# Patient Record
Sex: FEMALE | Race: BLACK OR AFRICAN AMERICAN
[De-identification: names, ages, dates, MRNs, and addresses within clinical notes are randomized per-mention and may not be internally consistent; named-entity substitution may affect disease eponyms.]

---

## 2018-09-06 ENCOUNTER — HOSPITAL ENCOUNTER (EMERGENCY)
Dept: HOSPITAL 74 - JER | Age: 58
Discharge: HOME | End: 2018-09-06
Payer: COMMERCIAL

## 2018-09-06 VITALS — SYSTOLIC BLOOD PRESSURE: 129 MMHG | TEMPERATURE: 98.6 F | HEART RATE: 79 BPM | DIASTOLIC BLOOD PRESSURE: 84 MMHG

## 2018-09-06 VITALS — BODY MASS INDEX: 23.7 KG/M2

## 2018-09-06 DIAGNOSIS — I10: ICD-10-CM

## 2018-09-06 DIAGNOSIS — K21.9: Primary | ICD-10-CM

## 2018-09-06 DIAGNOSIS — R07.89: ICD-10-CM

## 2018-09-06 LAB
ALBUMIN SERPL-MCNC: 4 G/DL (ref 3.4–5)
ALP SERPL-CCNC: 74 U/L (ref 45–117)
ALT SERPL-CCNC: 24 U/L (ref 12–78)
ANION GAP SERPL CALC-SCNC: 10 MMOL/L (ref 8–16)
APPEARANCE UR: (no result)
AST SERPL-CCNC: 15 U/L (ref 15–37)
BASOPHILS # BLD: 0.3 % (ref 0–2)
BILIRUB SERPL-MCNC: 0.8 MG/DL (ref 0.2–1)
BILIRUB UR STRIP.AUTO-MCNC: NEGATIVE MG/DL
BUN SERPL-MCNC: 11 MG/DL (ref 7–18)
CALCIUM SERPL-MCNC: 9.4 MG/DL (ref 8.5–10.1)
CHLORIDE SERPL-SCNC: 107 MMOL/L (ref 98–107)
CO2 SERPL-SCNC: 27 MMOL/L (ref 21–32)
COLOR UR: (no result)
CREAT SERPL-MCNC: 0.9 MG/DL (ref 0.55–1.02)
DEPRECATED RDW RBC AUTO: 12.6 % (ref 11.6–15.6)
EOSINOPHIL # BLD: 0.8 % (ref 0–4.5)
GLUCOSE SERPL-MCNC: 158 MG/DL (ref 74–106)
HCT VFR BLD CALC: 39.1 % (ref 32.4–45.2)
HGB BLD-MCNC: 13.1 GM/DL (ref 10.7–15.3)
INR BLD: 1.01 (ref 0.83–1.09)
KETONES UR QL STRIP: NEGATIVE
LEUKOCYTE ESTERASE UR QL STRIP.AUTO: NEGATIVE
LYMPHOCYTES # BLD: 34.1 % (ref 8–40)
MCH RBC QN AUTO: 30.5 PG (ref 25.7–33.7)
MCHC RBC AUTO-ENTMCNC: 33.6 G/DL (ref 32–36)
MCV RBC: 90.9 FL (ref 80–96)
MONOCYTES # BLD AUTO: 7.4 % (ref 3.8–10.2)
NEUTROPHILS # BLD: 57.4 % (ref 42.8–82.8)
NITRITE UR QL STRIP: NEGATIVE
PH UR: 8 [PH] (ref 5–8)
PLATELET # BLD AUTO: 210 K/MM3 (ref 134–434)
PMV BLD: 10.5 FL (ref 7.5–11.1)
POTASSIUM SERPLBLD-SCNC: 3.6 MMOL/L (ref 3.5–5.1)
PROT SERPL-MCNC: 8.1 G/DL (ref 6.4–8.2)
PROT UR QL STRIP: NEGATIVE
PROT UR QL STRIP: NEGATIVE
PT PNL PPP: 11.4 SEC (ref 9.7–13)
RBC # BLD AUTO: 4.3 M/MM3 (ref 3.6–5.2)
SODIUM SERPL-SCNC: 144 MMOL/L (ref 136–145)
SP GR UR: 1 (ref 1–1.03)
UROBILINOGEN UR STRIP-MCNC: NEGATIVE MG/DL (ref 0.2–1)
WBC # BLD AUTO: 4.8 K/MM3 (ref 4–10)

## 2018-09-06 PROCEDURE — 3E033GC INTRODUCTION OF OTHER THERAPEUTIC SUBSTANCE INTO PERIPHERAL VEIN, PERCUTANEOUS APPROACH: ICD-10-PCS | Performed by: EMERGENCY MEDICINE

## 2018-09-06 NOTE — EKG
Test Reason : 

Blood Pressure : ***/*** mmHG

Vent. Rate : 097 BPM     Atrial Rate : 097 BPM

   P-R Int : 202 ms          QRS Dur : 076 ms

    QT Int : 338 ms       P-R-T Axes : 053 055 053 degrees

   QTc Int : 429 ms

 

NORMAL SINUS RHYTHM

POSSIBLE LEFT ATRIAL ENLARGEMENT

NONSPECIFIC T WAVE ABNORMALITY

ABNORMAL ECG

NO PREVIOUS ECGS AVAILABLE

Confirmed by JING VIVEROS MD ([id number]) on 9/6/2018 1:43:18 PM

 

Referred By:             Confirmed By:JING VIVEROS MD

## 2018-09-06 NOTE — PDOC
Attending Attestation





- Resident


Resident Name: Kendall Tobin





- ED Attending Attestation


I have performed the following: I have examined & evaluated the patient, The 

case was reviewed & discussed with the resident, I agree w/resident's findings 

& plan, Exceptions are as noted





- HPI


HPI: 





09/06/18 10:49





57 yo female w/ pmh of HTN, GERD, and DM who presents for evaluation of 2-3 day 

history of left sided chest pain.  Pain is nonexertional intermittent no 

exacerbating or alleviating factors no associated dizziness lightheadedness 

nausea. Patient has had this pain intermittently over the last 2 years has seen 

cardiology for and has had a catheterization in the past which was normal per 

the patient





She has recently followed up with gastroenterology for further evaluation of 

this had a normal endoscopy and is scheduled next week for a ultrasound








ROS:  A complete review of 10 out of 10 review of systems is taken and is 

negative apart from what is previously mentioned below and in the HPI.





- Physicial Exam


PE: 





09/06/18 10:50








Vitals: Triage Vital signs reviewed  


General Appearance:  no acute distress, well nourished well developed, 


Head: Atraumatic, 


Neck:  Supple;No Nucal rigidity


Chest Wall: Nontender


Cardiac: Regular rate and rhythym, no murmurs, no rubs, no gallops, 


Lungs: Clear to auscultation bilateral, good air movement bilaterally,


Abdomen:  Soft, non distended, normal bowel sounds, non tender to palpation


Extremities: Full range of motion to all extremities, no cyanosis, clubbing, or 

edema


Skin:  Warm and dry, no rashes or lesions, no rash, no petechiae








- Medical Decision Making





09/06/18 10:51





Atypical chest discomfort chronic no acute changes heart score to EKG 

nonischemic troponin negative





Patient has follow-up.





Feels better after Pepcid





Sitting comfortably in the emergency department eating a sandwich.





Findings, need for follow-up and strict return instructions discussed with 

patient.








**Heart Score/ECG Review





- History


History: Slightly suspicious





- Electrocardiogram


EKG: Normal





- Age


Age: 45-65





- Risk Factors


Risk Factors Heart Score: Yes Hx Hypertension, Yes Hx Diabetes


Based on the list above the patient has:: 1-2 risk factors





- Troponin


Troponin: </= normal limit





- Score


Heart Score - Total: 2





- ECG Impressions


Comment:: 





09/06/18 10:52





EKG performed at 8:10 AM. Demonstrates normal sinus rhythm NH interval 2 or 2, 

QRS 76, . No ST elevations. No T-wave inversions.





Interpreted by me.

## 2018-09-06 NOTE — PDOC
History of Present Illness





- General


Stated Complaint: THROAT PAIN


Time Seen by Provider: 09/06/18 08:04





- History of Present Illness


Initial Comments: 





09/06/18 08:04


Ms. Damon is a 59 yo female w/ pmh of HTN, GERD, and DM who presents for 

evaluation of 2-3 day history of left sided chest pain. Patient additionally 

reports difficulty sleeping last night and that she woke up from a dream with 

epigastric pain and throat pain. Patient has previously had episodes of 

palpitations for which she has been evaluated with a holter monitor with no 

acute findings. 





The patient denies shortness of breath, headache and dizziness. Denies fever, 

chills, nausea, vomit, diarrhea and constipation. Denies dysuria, frequency, 

urgency and hematuria. 





Allergies: NKDA





Past History





- Past Medical History


Allergies/Adverse Reactions: 


 Allergies











Allergy/AdvReac Type Severity Reaction Status Date / Time


 


No Known Allergies Allergy   Verified 09/06/18 07:58











Home Medications: 


Ambulatory Orders





Amlodipine Besylate 40 mg PO DAILY 09/06/18 


Hydrochlorothiazide [Hctz -] 12.5 mg PO DAILY 09/06/18 


Lisinopril [Prinivil] 20 mg PO DAILY 09/06/18 











**Review of Systems





- Review of Systems


Comments:: 





09/06/18 08:04


GENERAL/CONSTITUTIONAL: No fever or chills. No weakness.


HEAD, EYES, EARS, NOSE AND THROAT: +Throat pain x1 day. No change in vision. No 

ear pain or discharge. 


CARDIOVASCULAR: +Left sided constant chest pain. No shortness of breath


RESPIRATORY: No cough, wheezing, or hemoptysis.


GASTROINTESTINAL: +Epigastric pain as described. No nausea, vomiting, diarrhea 

or constipation.


GENITOURINARY: No dysuria, frequency, or change in urination.


MUSCULOSKELETAL: No joint or muscle swelling or pain. No neck or back pain.


SKIN: No rash


NEUROLOGIC: No headache, vertigo, loss of consciousness, or change in strength/

sensation.


ENDOCRINE: No increased thirst. No abnormal weight change


HEMATOLOGIC/LYMPHATIC: No anemia, easy bleeding, or history of blood clots.


ALLERGIC/IMMUNOLOGIC: No hives or skin allergy.





*Physical Exam





- Physical Exam


Comments: 





09/06/18 08:05


GENERAL: Awake, alert, and fully oriented, in no acute distress


HEAD: No signs of trauma, normocephalic, atraumatic 


EYES: PERRLA, EOMI, sclera anicteric, conjunctiva clear


ENT: Auricles normal inspection, hearing grossly normal, nares patent, 

oropharynx clear without


exudates. Moist mucosa


NECK: Normal ROM, supple, no lymphadenopathy, JVD, or masses


LUNGS: No distress, speaks full sentences, clear to auscultation bilaterally 


HEART: +Mild tachycardia. Regular rhythm, normal S1 and S2, no murmurs, rubs or 

gallops, peripheral pulses normal and equal bilaterally. 


ABDOMEN: +Epigastric TTP. Soft, normoactive bowel sounds. No guarding, no 

rebound. No masses


EXTREMITIES: Normal inspection, Normal range of motion, no edema. No clubbing 

or cyanosis. 


NEUROLOGICAL: Cranial nerves II through XII grossly intact. Normal speech, 

normal gait, no focal sensorimotor deficits 


SKIN: Warm, Dry, normal turgor, no rashes or lesions noted. 








ED Treatment Course





- LABORATORY


CBC & Chemistry Diagram: 


 09/06/18 08:40





 09/06/18 08:40





Medical Decision Making





- Medical Decision Making





09/06/18 08:37


Ms. Damon is a 59 yo female w/ pmh as described who presents for evaluation of 

chest pain complicated by epigastric and throat pain concerning for GERD vs. 

ACS. Will evaluate with monitor/EKG/cardiac labs and medicate for symptomatic 

relief.





09/06/18 08:39


EKG mildly tachy at rate of 97, otherwise regular rhythm, normal access, normal 

interval, no ST elevations or depressions - normal EKG.





09/06/18 10:43


Patient reporting relief from symptoms following pepcid and maalox. Suspect 

GERD as etiology of symptoms. Labs grossly wnl as below. Discharging to home w/ 

instructions to f/u w/ PCP for further evaluation. Patient verbalized 

understanding and agreement and will comply.





 Laboratory Results - last 24 hr











  09/06/18 09/06/18 09/06/18





  08:40 08:40 08:40


 


WBC  4.8  


 


RBC  4.30  


 


Hgb  13.1  


 


Hct  39.1  


 


MCV  90.9  


 


MCH  30.5  


 


MCHC  33.6  


 


RDW  12.6  


 


Plt Count  210  


 


MPV  10.5  


 


Absolute Neuts (auto)  2.7  


 


Neutrophils %  57.4  


 


Lymphocytes %  34.1  


 


Monocytes %  7.4  


 


Eosinophils %  0.8  


 


Basophils %  0.3  


 


Nucleated RBC %  0  


 


PT with INR   11.40 


 


INR   1.01 


 


Sodium    144


 


Potassium    3.6


 


Chloride    107


 


Carbon Dioxide    27


 


Anion Gap    10


 


BUN    11


 


Creatinine    0.9


 


Creat Clearance w eGFR    > 60


 


Random Glucose    158 H


 


Calcium    9.4


 


Total Bilirubin    0.8


 


AST    15


 


ALT    24


 


Alkaline Phosphatase    74


 


Creatine Kinase    81


 


Troponin I    < 0.02


 


Total Protein    8.1


 


Albumin    4.0


 


Urine Color   


 


Urine Appearance   


 


Urine pH   


 


Ur Specific Gravity   


 


Urine Protein   


 


Urine Glucose (UA)   


 


Urine Ketones   


 


Urine Blood   


 


Urine Nitrite   


 


Urine Bilirubin   


 


Urine Urobilinogen   


 


Ur Leukocyte Esterase   














  09/06/18





  09:02


 


WBC 


 


RBC 


 


Hgb 


 


Hct 


 


MCV 


 


MCH 


 


MCHC 


 


RDW 


 


Plt Count 


 


MPV 


 


Absolute Neuts (auto) 


 


Neutrophils % 


 


Lymphocytes % 


 


Monocytes % 


 


Eosinophils % 


 


Basophils % 


 


Nucleated RBC % 


 


PT with INR 


 


INR 


 


Sodium 


 


Potassium 


 


Chloride 


 


Carbon Dioxide 


 


Anion Gap 


 


BUN 


 


Creatinine 


 


Creat Clearance w eGFR 


 


Random Glucose 


 


Calcium 


 


Total Bilirubin 


 


AST 


 


ALT 


 


Alkaline Phosphatase 


 


Creatine Kinase 


 


Troponin I 


 


Total Protein 


 


Albumin 


 


Urine Color  Ltyellow


 


Urine Appearance  Slcloudy


 


Urine pH  8.0


 


Ur Specific Gravity  1.005


 


Urine Protein  Negative


 


Urine Glucose (UA)  Negative


 


Urine Ketones  Negative


 


Urine Blood  Negative


 


Urine Nitrite  Negative


 


Urine Bilirubin  Negative


 


Urine Urobilinogen  Negative


 


Ur Leukocyte Esterase  Negative














*DC/Admit/Observation/Transfer


Diagnosis at time of Disposition: 


 Atypical chest pain





GERD (gastroesophageal reflux disease)


Qualifiers:


 Esophagitis presence: esophagitis presence not specified Qualified Code(s): 

K21.9 - Gastro-esophageal reflux disease without esophagitis








- Discharge Dispostion


Disposition: HOME





- Referrals





- Patient Instructions


Printed Discharge Instructions:  DI for Gastroesophageal Reflux Disease (GERD), 

DI for Atypical Chest Pain


Additional Instructions: 


You were evaluated today in the ER for your chest and epigastric pain. No acute 

findings were seen at this time and your pain improved with treatment of 

reflux. Please follow-up with primary care provider in 1-2 days for further 

evaluation. Return to ER if any return of pain, fever, chills, or other 

concerning symptoms.





- Post Discharge Activity

## 2018-10-05 ENCOUNTER — HOSPITAL ENCOUNTER (OUTPATIENT)
Dept: HOSPITAL 74 - JASU-SURG | Age: 58
Discharge: HOME | End: 2018-10-05
Attending: SURGERY
Payer: COMMERCIAL

## 2018-10-05 VITALS — SYSTOLIC BLOOD PRESSURE: 120 MMHG | HEART RATE: 90 BPM | TEMPERATURE: 97.8 F | DIASTOLIC BLOOD PRESSURE: 76 MMHG

## 2018-10-05 VITALS — BODY MASS INDEX: 23.7 KG/M2

## 2018-10-05 DIAGNOSIS — K66.0: ICD-10-CM

## 2018-10-05 DIAGNOSIS — K80.10: Primary | ICD-10-CM

## 2018-10-05 PROCEDURE — 0FT44ZZ RESECTION OF GALLBLADDER, PERCUTANEOUS ENDOSCOPIC APPROACH: ICD-10-PCS | Performed by: SURGERY

## 2018-10-05 NOTE — HP
History & Physical Update





- History


History: No Change





- Physical


Physical: No Change





- Assessment


Assessment: No Change





- Plan


Plan: No Change (H & P done on 09/27/18)

## 2018-10-05 NOTE — OP
DATE OF OPERATION:  10/05/2018

 

LOCATION:  Ambulatory surgery.

 

PROCEDURE:  Laparoscopic cholecystectomy and lysis of adhesions.  

 

PREOPERATIVE DIAGNOSIS:  Chronic cholecystitis with cholelithiasis.  

 

POSTOPERATIVE DIAGNOSIS:  Chronic cholecystitis with cholelithiasis and

intraabdominal adhesions.  

 

SURGEON:  Solo Ozuna M.D. 

 

FIRST ASSISTANT:  ODALIS Sanchez

 

ANESTHESIA:  General endotracheal anesthesia.

 

FINDINGS AND PROCEDURE:  This is a 58-year-old female who presents with chronic right

upper quadrant pain radiating to the back which initially she was treated for H.

pylori infection; however, the pain persisted, so patient an ultrasound was done and

revealed gallbladder stones versus sludge.  So patient was advised removal of the

gallbladder, and consent was obtained after discussing the risks, benefits, and

alternatives to the procedure.  

 

Patient was brought to the operating room and placed in supine position.  General

endotracheal anesthesia was then administered.  The abdomen was then prepped and

draped in the usual sterile fashion; using 0.5% Marcaine local anesthesia was

administered to the proposed incision sites.  The peritoneal cavity was entered using

the Optiview technique via 5-mm umbilical incision.  Using a 5-mm, 0-degree scope,

inserted in 5-mm Optiview port.  Pneumoperitoneum was established.  The patient was

then placed in reverse Trendelenburg, left side down position.  An 11-mm subxiphoid

port was inserted and two 5-mm subcostal ports were also inserted under direct

vision.  Dense adhesions of the liver to the posterior abdominal wall were noted. 

These were taken down using the hook dissector connected to monopolar cautery. 

Gallbladder fundus was identified and grasped and retracted superiorly.  The

infundibulum was identified, and at this point the common bile duct was noted to be

adherent to the proximal gallbladder wall.  Careful dissection using combined _____

dissector and peanut dissector was done until the common bile duct was clearly

 away from the gallbladder wall and exposing the triangle of Calot.  Cystic

duct and cystic artery were identified and were carefully isolated using the hook

dissector.  Critical view of safety was established by making a window behind the

cystic artery, gallbladder, wall, and liver bed with common bile duct clearly

visualized medial to the critical view of safety.  The cystic duct was then clipped

at 3 points followed by transection _____ 2 branches of the cystic artery were also

clipped at 3 points followed by transection leaving 2 clips at the branches of the

cystic artery.  The gallbladder was dissected from its bed in antegrade fashion using

the hook dissector.  After this was completed, the gallbladder was placed in endobag

and extracted via the subxiphoid incision.  The gallbladder bed was carefully

inspected and was noted to be free of active bleeding.  The pneumoperitoneum was

evacuated, and the ports were removed.  Wounds were closed with subcuticular Biosyn 4

sutures reinforced with Dermabond. The patient was successfully extubated and

transferred to the post anesthesia care unit in satisfactory condition.  Estimated

blood loss was about 5 mL.  Wound class clean, contaminated.  The patient received 1

g Ancef prior to the procedure.  

 

 

JENNIFER [id number]

DD: 10/05/2018 12:08

DT: 10/05/2018 16:30

Job #:  [id number]

## 2018-10-05 NOTE — SURG
Surgery First Assist Note


First Assist: Allyssa Cook PA-C (Suzy)


Date of Service: 10/05/18


Diagnosis: 


Chronic cholecystitis





Procedure: 





Laparoscopic cholecystectomy, lysis of adhesions


I was present for the entirety of the operative procedure. For further detail, 

please refer to operative report.








Visit type





- Case Type


Case Type: Scheduled





- Emergency


Emergency Visit: No





- New patient


This patient is new to me today: Yes


Date on this admission: 10/05/18





- Critical Care


Critical Care patient: No

## 2018-10-05 NOTE — OP
Operative Note





- Note:


Operative Date: 10/05/18


Pre-Operative Diagnosis: Chronic cholecystitis


Operation: Laparoscopic cholecystectomy, TONE


Findings: 





Intra-abdominal adhesions and GB with small stones


Post-Operative Diagnosis: Other (Intra-abdominal adhesions)


Surgeon: Solo Ozuna


Assistant: Allyssa Cook


Anesthesia: General


Specimens Removed: GB


Estimated Blood Loss (mls): 5


Operative Report Dictated: Yes

## 2018-10-07 ENCOUNTER — HOSPITAL ENCOUNTER (EMERGENCY)
Dept: HOSPITAL 74 - JER | Age: 58
Discharge: HOME | End: 2018-10-07
Payer: COMMERCIAL

## 2018-10-07 VITALS — BODY MASS INDEX: 23.7 KG/M2

## 2018-10-07 VITALS — DIASTOLIC BLOOD PRESSURE: 74 MMHG | SYSTOLIC BLOOD PRESSURE: 118 MMHG | HEART RATE: 74 BPM | TEMPERATURE: 98.4 F

## 2018-10-07 DIAGNOSIS — E11.9: ICD-10-CM

## 2018-10-07 DIAGNOSIS — K21.9: ICD-10-CM

## 2018-10-07 DIAGNOSIS — E87.6: ICD-10-CM

## 2018-10-07 DIAGNOSIS — I10: ICD-10-CM

## 2018-10-07 DIAGNOSIS — Z98.890: ICD-10-CM

## 2018-10-07 DIAGNOSIS — K56.0: Primary | ICD-10-CM

## 2018-10-07 LAB
ALBUMIN SERPL-MCNC: 3.9 G/DL (ref 3.4–5)
ALP SERPL-CCNC: 77 U/L (ref 45–117)
ALT SERPL-CCNC: 30 U/L (ref 13–61)
ANION GAP SERPL CALC-SCNC: 4 MMOL/L (ref 8–16)
APPEARANCE UR: CLEAR
AST SERPL-CCNC: 17 U/L (ref 15–37)
BASOPHILS # BLD: 0.3 % (ref 0–2)
BILIRUB SERPL-MCNC: 0.9 MG/DL (ref 0.2–1)
BILIRUB UR STRIP.AUTO-MCNC: NEGATIVE MG/DL
BUN SERPL-MCNC: 13 MG/DL (ref 7–18)
CALCIUM SERPL-MCNC: 9.4 MG/DL (ref 8.5–10.1)
CHLORIDE SERPL-SCNC: 102 MMOL/L (ref 98–107)
CO2 SERPL-SCNC: 33 MMOL/L (ref 21–32)
COLOR UR: COLORLESS
CREAT SERPL-MCNC: 1 MG/DL (ref 0.55–1.3)
DEPRECATED RDW RBC AUTO: 13 % (ref 11.6–15.6)
EOSINOPHIL # BLD: 0.4 % (ref 0–4.5)
GLUCOSE SERPL-MCNC: 125 MG/DL (ref 74–106)
HCT VFR BLD CALC: 39.7 % (ref 32.4–45.2)
HGB BLD-MCNC: 13.2 GM/DL (ref 10.7–15.3)
KETONES UR QL STRIP: NEGATIVE
LEUKOCYTE ESTERASE UR QL STRIP.AUTO: NEGATIVE
LIPASE SERPL-CCNC: 124 U/L (ref 73–393)
LYMPHOCYTES # BLD: 29.5 % (ref 8–40)
MCH RBC QN AUTO: 30.6 PG (ref 25.7–33.7)
MCHC RBC AUTO-ENTMCNC: 33.2 G/DL (ref 32–36)
MCV RBC: 92.1 FL (ref 80–96)
MONOCYTES # BLD AUTO: 10.1 % (ref 3.8–10.2)
NEUTROPHILS # BLD: 59.7 % (ref 42.8–82.8)
NITRITE UR QL STRIP: NEGATIVE
PH UR: 7 [PH] (ref 5–8)
PLATELET # BLD AUTO: 250 K/MM3 (ref 134–434)
PMV BLD: 10 FL (ref 7.5–11.1)
POTASSIUM SERPLBLD-SCNC: 3.4 MMOL/L (ref 3.5–5.1)
PROT SERPL-MCNC: 8.2 G/DL (ref 6.4–8.2)
PROT UR QL STRIP: NEGATIVE
PROT UR QL STRIP: NEGATIVE
RBC # BLD AUTO: 4.3 M/MM3 (ref 3.6–5.2)
SODIUM SERPL-SCNC: 139 MMOL/L (ref 136–145)
SP GR UR: 1 (ref 1.01–1.03)
UROBILINOGEN UR STRIP-MCNC: NEGATIVE MG/DL (ref 0.2–1)
WBC # BLD AUTO: 5.8 K/MM3 (ref 4–10)

## 2018-10-07 PROCEDURE — 3E033NZ INTRODUCTION OF ANALGESICS, HYPNOTICS, SEDATIVES INTO PERIPHERAL VEIN, PERCUTANEOUS APPROACH: ICD-10-PCS

## 2018-10-07 PROCEDURE — 3E033GC INTRODUCTION OF OTHER THERAPEUTIC SUBSTANCE INTO PERIPHERAL VEIN, PERCUTANEOUS APPROACH: ICD-10-PCS

## 2018-10-07 NOTE — PDOC
Attending Attestation





- Resident


Resident Name: Matthieu Vaca





- ED Attending Attestation


I have performed the following: I have examined & evaluated the patient, The 

case was reviewed & discussed with the resident, I agree w/resident's findings 

& plan





- HPI


HPI: 





10/07/18 13:52





Ms. Damon is a 57 yo female w/ pmh of HTN, GERD, and DM, recent chronic 

cholecystitis s/p cholecystectomy on 10/5/18, now with nausea, vomiting and 

Abdominal pain for the past 2 days. She reports that she has had 2 episodes of 

vomiting, nonbloody and nonbilious. She notes that her last bowel movement 

occured prior to her surgery and has not had a bowel movement for 2 days. +

passing gas. She describes her abdominal pain as localized near her epigastric 

region, 5/10, over the incisional scars, ranging from mild to moderate. She 

reports a 5/10 in severity. 





The patient denies chest pain, shortness of breath, headache and dizziness. 

Denies fever, chills, diarrhea. 





Allergies: None 


Past surgical history: Cholecystectomy 


Social History: No alcohol, tobacco or drug use reported 





10/07/18 13:57








- Physicial Exam


PE: 





10/07/18 13:52





NAD, well appearing, MMM, nl conjunctiva, anicteric; neck supple. lungs clear, 

RRR, abdomen soft and (+) tender at incisional sites in RUQ/epigastrium, (+) 

Anterior laproscopic surgical scars, no purulence.  No rebound or guarding, no 

CVAT. CAMPUZANO x4, no focal neuro deficits. No peripheral edema. normal color for 

ethnicity, WWP.








- Medical Decision Making





10/07/18 13:53


I, Sarah Roberts MD, attest that this document has been prepared under my 

direction and personally reviewed by me in its entirety.   I further attest, 

that it accurately reflects all work, treatment, procedures and medical decision

-making performed by me.  





Ms. Damon is a 57 yo female w/ pmh of HTN, GERD, and DM, recent chronic 

cholecystitis presenting with post op AP, over incisional scars, n/v and 

constipation x 2 days, post op for uncomplicated lap krista with Dr Ozuna on 10/5/

18. +passing gas.





DDx. post op ileus, perf viscus, intra abdominal injury/infection, abscess, 

abdominal wall abscess, PUD, GERD, esophageal spasm, SBO, medication side effect

, electrolyte/metabolic derangements, pancreatitis, hepatitis. dehydration. UTI





vitals wnl.


labs and lytes wnl, borderline low potassium 3.4. LFTs/lipase normal. UA neg 

for ketones or infection


trop neg, EKG nonischemic, nonspecific T wave abnormalities, similar to prior 

episodes


given IVF, analgesia and antiemetics. pepcid IV.


CT a/p: ileus, no obstruction or hernia. free air in setting of recent lap 

krista surgery and post op course, otherwise abdomen without peritoneal findings.


on reexam feels improved, remains well appearing, no acute events in the ED





spoke with Dr. Ozuna, primary surgeon, agree with plan, bowel regimen, stable with 

dc and close followup


discussed with patient, agrees with discharge, bowel regimen, has stool 

softeners at home, fiber diet, hydration, adequate rest and Dr. Ozuna surgery 

followup


return precautions discussed


CT scan and labs reviewed.


DC in stable condition.








10/07/18 14:12








**Heart Score/ECG Review





- ECG Impressions


Comment:: 





10/07/18 13:57





EKG normal sinus rhythm, no interval abnormalities, narrow QRS, ST and T wave 

segments and morphology normal. Nonspecific T wave abnormalities

## 2018-10-07 NOTE — PDOC
History of Present Illness





- General


Chief Complaint: Pain, Acute


Stated Complaint: ABDOMINA PAIN/BACK PAIN


Time Seen by Provider: 10/07/18 09:36


History Source: Patient


Exam Limitations: No Limitations





- History of Present Illness


Travel History: No


Initial Comments: 





Nettie is a 57 yo female w/ a pmh of recent Lap krista 2 days prior, 2 abdominal 

operations 8 and 9 years ago (partial hysterectomy, then oopherectomy), HTN, 

GERD, and DM who presents to the ED with abdominal distension, nausea, vomiting 

(NBNB) and not having been able to make a bowel movement or pass any flatus 

since her surgery. She has been taking 1-2 percocets a day for pain control, 

but it has not been sufficient. She also reports having some mild throat 

discomfort. 


She denies chest pain, shortness of breath, difficulty breathing, fevers, chills

, infections, or any urinary complaints.  





PCP: Ladonna Pandey


Allergies: NKA, NKDA


Social Hx: Denies using cigarettes, alcohol, or other illicit drugs. 








Past History





- Past Medical History


Allergies/Adverse Reactions: 


 Allergies











Allergy/AdvReac Type Severity Reaction Status Date / Time


 


No Known Allergies Allergy   Verified 10/07/18 09:11











Home Medications: 


Ambulatory Orders





Amlodipine Besylate 40 mg PO DAILY 09/06/18 


Hydrochlorothiazide [Hctz -] 12.5 mg PO DAILY 09/06/18 


Lisinopril [Prinivil] 20 mg PO DAILY 09/06/18 


Oxycodone HCl/Acetaminophen [Percocet 5-325 mg Tablet] 1 - 2 tab PO Q6H #12 tab 

MDD 5 10/05/18 


Enema Bag, Disposable [Enema Bag] 1 each RC PRN #7 each 10/07/18 


Ondansetron HCl [Zofran] 4 mg PO PRN #14 tablet 10/07/18 








COPD: No


Diabetes: Yes (no meds)


HTN: Yes





- Immunization History


Immunization Up to Date: No





- Suicide/Smoking/Psychosocial Hx


Smoking History: Never smoked


Have you smoked in the past 12 months: No


Hx Alcohol Use: No


Drug/Substance Use Hx: No


Substance Use Type: None


Hx Substance Use Treatment: No





**Review of Systems





- Review of Systems


Able to Perform ROS?: Yes


Is the patient limited English proficient: No


Constitutional: Yes: Loss of Appetite.  No: Chills, Diaphoresis, Fever


HEENTM: Yes: Throat Pain.  No: Blurred Vision


Respiratory: No: Cough, Shortness of Breath, Productive cough


Cardiac (ROS): No: Chest Pain, Lightheadedness, Palpitations


ABD/GI: Yes: Abdominal Distended, Constipated, Nausea, Poor Appetite, Vomiting.

  No: Diarrhea


: No: Burning, Dysuria, Discharge, Frequency


Musculoskeletal: No: Back Pain, Joint Swelling, Muscle Pain


Integumentary: No: Bruising, Change in Color


Neurological: No: Headache, Seizure, Weakness


Psychiatric: No: Anxiety, Depression


Endocrine: No: Excessive Sweating, Intolerance to Cold, Intolerance to Heat


Hematologic/Lymphatic: No: Anemia, Blood Clots, Easy Bleeding





*Physical Exam





- Vital Signs


 Last Vital Signs











Temp Pulse Resp BP Pulse Ox


 


 98.8 F   90   18   129/83   98 


 


 10/07/18 09:11  10/07/18 09:11  10/07/18 09:11  10/07/18 09:11  10/07/18 09:11














- Physical Exam


General Appearance: Yes: Nourished, Appropriately Dressed.  No: Apparent 

Distress


HEENT: positive: EOMI, LUCIAN, Normal ENT Inspection, Normal Voice, Pharynx Normal


Neck: positive: Trachea midline, Supple.  negative: Decreased range of motion, 

Lymphadenopathy (R), Lymphadenopathy (L)


Respiratory/Chest: positive: Lungs Clear, Normal Breath Sounds.  negative: 

Respiratory Distress, Crackles, Rales, Rhonchi


Cardiovascular: positive: Regular Rhythm, Regular Rate, S1, S2.  negative: Edema

, JVD, Murmur


Vascular Pulses: Dorsalis-Pedis (R): 2+, Doralis-Pedis (L): 2+


Gastrointestinal/Abdominal: positive: Soft, Increased Bowel Sounds, Distended.  

negative: Decreased BS, Guarding, Rebound


Lymphatic: negative: Adenopathy


Musculoskeletal: positive: Normal Inspection.  negative: CVA Tenderness, 

Decreased Range of Motion, Vertebral Tenderness


Extremity: positive: Normal Capillary Refill, Normal Inspection, Normal Range 

of Motion


Integumentary: positive: Normal Color, Dry, Warm.  negative: Jaundice, Rash


Neurologic: positive: CNs II-XII NML intact, Fully Oriented, Alert, Normal Mood/

Affect





ED Treatment Course





- LABORATORY


CBC & Chemistry Diagram: 


 10/07/18 11:30





 10/07/18 11:30





- RADIOLOGY


Radiology Studies Ordered: 














 Category Date Time Status


 


 ABDOMEN FLAT & UPRIGHT [RAD] Stat Radiology  10/07/18 10:31 Ordered














Medical Decision Making





- Medical Decision Making





Nettie is a 57 yo female w/ a pmh of recent Lap krista 2 days prior, 2 abdominal 

operations 8 and 9 years ago (partial hysterectomy, then oopherectomy), HTN, 

GERD, and DM who presents to the ED with abdominal distension, nausea, vomiting 

(NBNB) and not having been able to make a bowel movement or pass any flatus 

since her surgery. She has been taking 1-2 percocets a day for pain control, 

but it has not been sufficient. 


-percs can be causing constipation. 





DD includes but not limited to:  SBO vs paralytic ileus, postop infection, 

gastroenteritis, constipation, ACS, pancreatitis. 





Plan: Cbc, Cmp, lactic, lipase, trop, ua/uc, Ekg, CTAP, IVF-NS, zofran, 

Acetaminophen, re-assess.  





Cbc showed no elevated WBC


CMP showed low potassium - we repleted. 


other labs unremarkable. 


CTAP showed no SBO but likely paralytic ileus. 


Patient felt significantly better after meds, fluids, and was requesting to 

have food so she could eat.


PO challenge succesful.


Consulted her surgeon Dr. Ozuna who requested her to get a fleet enema. 





VS stable, patient feels well and would like to leave the ED.


Will discharge with strict return precautions.





*DC/Admit/Observation/Transfer


Diagnosis at time of Disposition: 


 Paralytic ileus








- Discharge Dispostion


Disposition: HOME


Condition at time of disposition: Stable


Decision to Admit order: No





- Prescriptions


Prescriptions: 


Enema Bag, Disposable [Enema Bag] 1 each RC PRN #7 each


Ondansetron HCl [Zofran] 4 mg PO PRN #14 tablet





- Referrals


Referrals: 


Ladonna Pandey MD [Primary Care Provider] - 





- Patient Instructions


Printed Discharge Instructions:  DI for Ileus


Additional Instructions: 


You came into the ER with nausea vomiting and not having passed a bowel 

movement in the past 2 days. We gave you some IV hydration, anti nausea meds. 

Your Cat Scan showed that your bowels is not obstructed. We believe you have 

what is called a paralytic ileus - see attached handout for further 

explanation. We are sending an enema to your pharmacy. please go pick it up. We 

are also sending zofran to your pharmacy to help with nausea. Please make sure 

to pick it up. 





Please come back to the ER if your abdominal pain worsens, your develop a high 

fever, start vomiting or have any other new or worsening concerns. 





Thank you for coming to the [facility] ER. We hope you feel better soon!  


Print Language: ENGLISH





- Post Discharge Activity

## 2018-10-08 NOTE — PATH
Surgical Pathology Report



Patient Name:  BASHIR PNEALOZA

Accession #:  [id number]

[facility]. Rec. #:  [id number]                                                        

   /Age/Gender:  1960 (Age: 58) / F

Account:  [id number]                                                          

             Location: [facility] SURGICAL

Taken:  10/5/2018

Received:  10/5/2018

Reported:  10/8/2018

Physicians:  Solo Ozuna M.D.

  



Specimen(s) Received

 GALLBLADDER 





Clinical History

Chronic cholecystitis







Final Diagnosis

GALLBLADDER, CHOLECYSTECTOMY:

CHRONIC CHOLECYSTITIS AND CHOLELITHIASIS.





***Electronically Signed***

Bhavya Jameson M.D.





Gross Description

Received in formalin, labeled "gallbladder," is a 7.5 x 2.6 x 2.4 cm.

gallbladder with a 0.2 cm. in length portion of cystic duct attached. The outer

surface is tan-gray and varies from smooth to shaggy. The lumen contains green,

tenacious bile as well as multiple black, irregular choleliths ranging from

0.4-0.5 cm in greatest dimension. The mucosa is brown with focal erosions. The

wall of the gallbladder measures 0.1 cm. in thickness. Representative sections

are submitted in one cassette.  

DL/10/5/2018



saudi/10/5/2018

## 2018-10-08 NOTE — EKG
Test Reason : 

Blood Pressure : ***/*** mmHG

Vent. Rate : 071 BPM     Atrial Rate : 071 BPM

   P-R Int : 180 ms          QRS Dur : 076 ms

    QT Int : 346 ms       P-R-T Axes : 054 044 -01 degrees

   QTc Int : 375 ms

 

NORMAL SINUS RHYTHM

T WAVE ABNORMALITY, CONSIDER ANTERIOR ISCHEMIA

ABNORMAL ECG

WHEN COMPARED WITH ECG OF 06-SEP-2018 08:10,

NO SIGNIFICANT CHANGE WAS FOUND

Confirmed by AMY STEPHEN MD ([id number]) on 10/8/2018 10:44:49 AM

 

Referred By:             Confirmed By:AMY STEPHEN MD

## 2019-02-07 ENCOUNTER — HOSPITAL ENCOUNTER (EMERGENCY)
Dept: HOSPITAL 74 - JER | Age: 59
Discharge: HOME | End: 2019-02-07
Payer: COMMERCIAL

## 2019-02-07 VITALS — TEMPERATURE: 98.4 F | HEART RATE: 76 BPM | SYSTOLIC BLOOD PRESSURE: 131 MMHG | DIASTOLIC BLOOD PRESSURE: 84 MMHG

## 2019-02-07 VITALS — BODY MASS INDEX: 23.6 KG/M2

## 2019-02-07 DIAGNOSIS — I10: ICD-10-CM

## 2019-02-07 DIAGNOSIS — K21.9: ICD-10-CM

## 2019-02-07 DIAGNOSIS — R07.89: Primary | ICD-10-CM

## 2019-02-07 DIAGNOSIS — E11.9: ICD-10-CM

## 2019-02-07 LAB
ALBUMIN SERPL-MCNC: 3.9 G/DL (ref 3.4–5)
ALP SERPL-CCNC: 69 U/L (ref 45–117)
ALT SERPL-CCNC: 22 U/L (ref 13–61)
ANION GAP SERPL CALC-SCNC: 7 MMOL/L (ref 8–16)
AST SERPL-CCNC: 13 U/L (ref 15–37)
BASOPHILS # BLD: 0.4 % (ref 0–2)
BILIRUB SERPL-MCNC: 1 MG/DL (ref 0.2–1)
BUN SERPL-MCNC: 11 MG/DL (ref 7–18)
CALCIUM SERPL-MCNC: 9 MG/DL (ref 8.5–10.1)
CHLORIDE SERPL-SCNC: 103 MMOL/L (ref 98–107)
CO2 SERPL-SCNC: 30 MMOL/L (ref 21–32)
CREAT SERPL-MCNC: 1 MG/DL (ref 0.55–1.3)
DEPRECATED RDW RBC AUTO: 12.7 % (ref 11.6–15.6)
EOSINOPHIL # BLD: 0.7 % (ref 0–4.5)
GLUCOSE SERPL-MCNC: 230 MG/DL (ref 74–106)
HCT VFR BLD CALC: 37.9 % (ref 32.4–45.2)
HGB BLD-MCNC: 13 GM/DL (ref 10.7–15.3)
INR BLD: 0.98 (ref 0.83–1.09)
LYMPHOCYTES # BLD: 24.5 % (ref 8–40)
MCH RBC QN AUTO: 31.6 PG (ref 25.7–33.7)
MCHC RBC AUTO-ENTMCNC: 34.2 G/DL (ref 32–36)
MCV RBC: 92.3 FL (ref 80–96)
MONOCYTES # BLD AUTO: 5.9 % (ref 3.8–10.2)
NEUTROPHILS # BLD: 68.5 % (ref 42.8–82.8)
PLATELET # BLD AUTO: 226 K/MM3 (ref 134–434)
PMV BLD: 9.6 FL (ref 7.5–11.1)
POTASSIUM SERPLBLD-SCNC: 3.7 MMOL/L (ref 3.5–5.1)
PROT SERPL-MCNC: 7.5 G/DL (ref 6.4–8.2)
PT PNL PPP: 11.6 SEC (ref 9.7–13)
RBC # BLD AUTO: 4.11 M/MM3 (ref 3.6–5.2)
SODIUM SERPL-SCNC: 139 MMOL/L (ref 136–145)
WBC # BLD AUTO: 4 K/MM3 (ref 4–10)

## 2019-02-07 PROCEDURE — 3E033GC INTRODUCTION OF OTHER THERAPEUTIC SUBSTANCE INTO PERIPHERAL VEIN, PERCUTANEOUS APPROACH: ICD-10-PCS | Performed by: EMERGENCY MEDICINE

## 2019-02-07 NOTE — EKG
Test Reason : 

Blood Pressure : ***/*** mmHG

Vent. Rate : 085 BPM     Atrial Rate : 085 BPM

   P-R Int : 186 ms          QRS Dur : 080 ms

    QT Int : 360 ms       P-R-T Axes : 069 088 -05 degrees

   QTc Int : 428 ms

 

NORMAL SINUS RHYTHM

T WAVE ABNORMALITY, CONSIDER ANTERIOR ISCHEMIA

ABNORMAL ECG

WHEN COMPARED WITH ECG OF 07-OCT-2018 11:30,

NO SIGNIFICANT CHANGE WAS FOUND

Confirmed by FELICE MARKS, JING ([id number]) on 2/7/2019 4:42:48 PM

 

Referred By:             Confirmed By:JING VIVEROS MD

## 2019-02-07 NOTE — PDOC
Attending Attestation





- HPI


HPI: 





02/07/19 09:54


The patient is a 58 year old female with a significant past medical history of 

HTN, GERD, DM off medication, s/p CCY in Oct 2018, who presents to the ED with 

complaint of intermittent chest palpitations and substernal pain occurring when 

waking from sleep for about a year. The patient states she was prompted to come 

to the ED because she recorded her BP to be slightly elevated at home.





She denies SOB, dizziness, HA. She denies urinary changes. She denies changes 

in BMs. She denies fevers, chills, n/v/d. 





PMD: Ladonna Pandey


Cardiologist: Pablito Cody





- Physicial Exam


PE: 





02/07/19 10:48


Vitals: Triage vital signs reviewed


General Appearance: No acute distress, well nourished, well developed


Head: Atraumatic


Eyes: Pupils equal reactive round, extraocular movement intact


Neck:  Supple; No nuchal rigidity


Chest Wall: Nontender


Cardiac: Regular rate and rhythm, no murmurs, no rubs, no gallops


Lungs: Clear to auscultation bilateral, good air movement bilaterally


Abdomen:  Soft, nondistended, normal bowel sounds, nontender to palpation


Extremities: Full range of motion to all extremities, no cyanosis, clubbing, or 

edema


Skin:  Warm and dry, no rashes or lesions, no rash, no petechiae


Neuro:  AOX3; Cranial Nerves 2-12 grossly intact, Strength intact to all 

extremities, Sensation intact to all extremities, gait normal








<Marva Taylor - Last Filed: 02/07/19 10:48>





- Resident


Resident Name: Babar Barajas





- ED Attending Attestation


I have performed the following: I have examined & evaluated the patient, The 

case was reviewed & discussed with the resident, I agree w/resident's findings 

& plan, Exceptions are as noted





- Medical Decision Making





02/07/19 11:54


The patient is a 58 year old female with a significant past medical history of 

HTN, GERD, DM off medication, s/p CCY in Oct 2018, who presents to the ED with 

complaint of intermittent chest palpitations and substernal pain occurring when 

waking from sleep for about a year. The patient states she was prompted to come 

to the ED because she recorded her BP to be slightly elevated at home.





She denies SOB, dizziness, HA. She denies urinary changes. She denies changes 

in BMs. She denies fevers, chills, n/v/d. 





PMD: Ladonna Pandey


Cardiologist: Pablito Cody








Atypical chest discomfort troponin negative 2.  Heart score 3.  EKG unchanged 

from previous performed at 534 demonstrates normal sinus rhythm no ST 

elevations isolated T-wave inversions in leads V3 V4 V5 old





Interpreted by me





Patient feels better after GI cocktail palpitations may be GI in etiology 

recommend follow-up with her PCP as well as gastroenterology she'll return to 

the ED for any severe worsening symptoms or for any concerns.





Findings, the need for follow-up and strict return instructions discussed with 

patient.








<Jamar Dawson - Last Filed: 02/07/19 11:55>





**Heart Score/ECG Review





- History


History: Slightly suspicious





- Electrocardiogram


EKG: Non specific repolarization disturbance





- Age


Age: 45-65





- Risk Factors


Risk Factors Heart Score: Yes Hx Hypertension, Yes Hx Diabetes


Based on the list above the patient has:: 1-2 risk factors





- Troponin


Troponin: </= normal limit





- Score


Heart Score - Total: 3





<Jamar Dawson - Last Filed: 02/07/19 11:55>





Attestations





- Attestations





02/07/19 09:57


Documentation prepared by Marva Taylor, acting as medical scribe for Jamar Dawson MD





<Marva Taylor - Last Filed: 02/07/19 10:48>

## 2019-02-07 NOTE — PDOC
History of Present Illness





- General


Chief Complaint: Chest Pain


Stated Complaint: CHEST PAIN


Time Seen by Provider: 02/07/19 07:40


History Source: Patient


Exam Limitations: No Limitations





- History of Present Illness


Initial Comments: 


Patient is a 59 y/o F w/ PMHx HTN, GERD, DM off medication, s/p CCY in Oct 2018

, p/w > 1 year intermittent chest palpitations and substernal pain occurring 

when waking from sleep and at other times w/o predictable inciting 

circumstances. Her cardiologist is aware of and managing this issue. Patient 

also took home BP measurements and was concerned they were higher than usual, 

prompting ED visit. Denies other complaints. At time of encounter vitals are 

stable, /91, glucose 230 (Pt reports going off diabetes medications 

during the past year), otherwise wnl. Initial troponin negative. EKG showing 

anterior lead t-wave changes improved from last known EKG in October, otherwise 

reassuring. 





PMD: Ladonna Pandey


Cardiologist: Pablito Cody


02/07/19 08:35








Past History





- Travel


Traveled outside of the country in the last 30 days: No


Close contact w/someone who was outside of country & ill: No





- Past Medical History


Allergies/Adverse Reactions: 


 Allergies











Allergy/AdvReac Type Severity Reaction Status Date / Time


 


No Known Allergies Allergy   Verified 02/07/19 05:08











Home Medications: 


Ambulatory Orders





Amlodipine Besylate 40 mg PO DAILY 09/06/18 


Hydrochlorothiazide [Hctz -] 12.5 mg PO DAILY 09/06/18 


Lisinopril [Prinivil] 40 mg PO DAILY 09/06/18 


Aspirin [ASA -] 81 mg PO DAILY 02/07/19 


Famotidine [Pepcid] 20 mg PO PRN 02/07/19 








COPD: No


Diabetes: Yes


HTN: Yes





- Immunization History


Immunization Up to Date: No





- Suicide/Smoking/Psychosocial Hx


Smoking History: Never smoked


Have you smoked in the past 12 months: No


Information on smoking cessation initiated: No


Hx Alcohol Use: No


Drug/Substance Use Hx: No


Substance Use Type: None


Hx Substance Use Treatment: No





**Review of Systems





- Review of Systems


Comments:: 


As per HPI


02/07/19 08:40








*Physical Exam





- Vital Signs


 Last Vital Signs











Temp Pulse Resp BP Pulse Ox


 


 97.6 F   79   18   152/91   100 


 


 02/07/19 05:08  02/07/19 05:08  02/07/19 05:08  02/07/19 05:08  02/07/19 05:08














- Physical Exam


Comments: 


Gen: A&Ox3, NAD


HEENT: NC/AT, PERRLA, EOMI, MMM


Neck: supple, NT, no LAD, no JVD


CV: RRR no m/r/g


Resp: CTA b/l


Abd: +bs, soft, NT, ND


Ext: 2+ pulses, wwp, no edema, no calf tenderness


Neuro: CNs, motor, sensory systems w/o focal deficit


Psych: normal mood, normal affect


Skin: warm, dry, normal turgor


02/07/19 08:40








Moderate Sedation





- Procedure Monitoring


Vital Signs: 


Procedure Monitoring Vital Signs











Temperature  97.6 F   02/07/19 05:08


 


Pulse Rate  79   02/07/19 05:08


 


Respiratory Rate  18   02/07/19 05:08


 


Blood Pressure  152/91   02/07/19 05:08


 


O2 Sat by Pulse Oximetry (%)  100   02/07/19 05:08











ED Treatment Course





- LABORATORY


CBC & Chemistry Diagram: 


 02/07/19 05:55





 02/07/19 05:55





- ADDITIONAL ORDERS


Additional order review: 


 Laboratory  Results











  02/07/19 02/07/19





  05:55 05:55


 


PT with INR   11.60


 


INR   0.98


 


Sodium  139 


 


Potassium  3.7 


 


Chloride  103 


 


Carbon Dioxide  30 


 


Anion Gap  7 L 


 


BUN  11 


 


Creatinine  1.0 


 


Creat Clearance w eGFR  57.35 


 


Random Glucose  230 H 


 


Calcium  9.0 


 


Total Bilirubin  1.0 


 


AST  13 L 


 


ALT  22 


 


Alkaline Phosphatase  69 


 


Creatine Kinase  83 


 


Troponin I  < 0.02 


 


Total Protein  7.5 


 


Albumin  3.9 








 











  02/07/19





  05:55


 


RBC  4.11


 


MCV  92.3


 


MCHC  34.2


 


RDW  12.7


 


MPV  9.6


 


Neutrophils %  68.5


 


Lymphocytes %  24.5


 


Monocytes %  5.9


 


Eosinophils %  0.7


 


Basophils %  0.4














Medical Decision Making





- Medical Decision Making


Will obtain second troponin, treat with PTX and pepcid, discharge for PMD and 

cardiology f/u if repeat troponin is negative.


02/07/19 08:42





Second troponin negative, will d/c as per above.


02/07/19 09:52








*DC/Admit/Observation/Transfer


Diagnosis at time of Disposition: 


 Atypical chest pain








- Discharge Dispostion


Disposition: HOME


Condition at time of disposition: Stable





- Referrals


Referrals: 


Ladonna Pandey MD [Non Staff, Medical] - 


Pablito Cody MD [Staff Physician] - 





- Patient Instructions


Printed Discharge Instructions:  DI for Atypical Chest Pain


Additional Instructions: 


You were evaluated for chest pain and palpitations. Lab work and EKG 

demonstrated no acute cardiac problems. Your blood sugar was found to be 

elevated, consistent with not having recent medical treatment of your diabetes. 

Please follow up at your earliest opportunity with your primary medical doctor 

and cardiologist. If you have recurrent or worsening chest pain, develop 

shortness of breath, develop swelling, lose consciousness, have new focal 

weakness or changes in sensation, or any other new or concerning symptoms, 

please return to the Emergency Department.





- Post Discharge Activity

## 2019-04-24 ENCOUNTER — HOSPITAL ENCOUNTER (EMERGENCY)
Dept: HOSPITAL 74 - JER | Age: 59
Discharge: HOME | End: 2019-04-24
Payer: COMMERCIAL

## 2019-04-24 VITALS — TEMPERATURE: 98 F | SYSTOLIC BLOOD PRESSURE: 161 MMHG | DIASTOLIC BLOOD PRESSURE: 94 MMHG | HEART RATE: 94 BPM

## 2019-04-24 VITALS — BODY MASS INDEX: 23.6 KG/M2

## 2019-04-24 DIAGNOSIS — I10: Primary | ICD-10-CM

## 2019-04-24 NOTE — PDOC
History of Present Illness





- General


Chief Complaint: Blood Pressure Problem


Stated Complaint: HYPERTENSION


Time Seen by Provider: 04/24/19 10:17


History Source: Patient





- History of Present Illness


Timing/Duration: 1 week





Past History





- Past Medical History


Allergies/Adverse Reactions: 


 Allergies











Allergy/AdvReac Type Severity Reaction Status Date / Time


 


No Known Allergies Allergy   Verified 04/24/19 09:54











Home Medications: 


Ambulatory Orders





Amlodipine Besylate 40 mg PO DAILY 09/06/18 


Hydrochlorothiazide [Hctz -] 12.5 mg PO DAILY 09/06/18 


Lisinopril [Prinivil] 40 mg PO DAILY 09/06/18 


Aspirin [ASA -] 81 mg PO DAILY 02/07/19 


Famotidine [Pepcid] 20 mg PO PRN 02/07/19 








COPD: No


Diabetes: Yes


HTN: Yes





- Immunization History


Immunization Up to Date: No





- Suicide/Smoking/Psychosocial Hx


Smoking History: Never smoked


Have you smoked in the past 12 months: No


Information on smoking cessation initiated: No


Hx Alcohol Use: No


Drug/Substance Use Hx: No


Substance Use Type: None


Hx Substance Use Treatment: No





**Review of Systems





- Review of Systems


Constitutional: No: Chills, Fever


Respiratory: No: Shortness of Breath


Cardiac (ROS): No: Chest Pain


Neurological: No: Headache, Dizziness





*Physical Exam





- Vital Signs


 Last Vital Signs











Temp Pulse Resp BP Pulse Ox


 


 98 F   94 H  18   161/94   98 


 


 04/24/19 09:52  04/24/19 09:52  04/24/19 09:52  04/24/19 09:52  04/24/19 09:52














- Physical Exam


General Appearance: Yes: Appropriately Dressed.  No: Apparent Distress


HEENT: positive: Normal Voice


Neck: positive: Supple


Respiratory/Chest: negative: Respiratory Distress


Integumentary: positive: Dry, Warm


Neurologic: positive: Fully Oriented, Alert, Normal Mood/Affect





Medical Decision Making





- Medical Decision Making





04/24/19 10:42


57 yo F, h/o HTN, on meds, here for concern regarding her blood pressure. 

States BP has recently been in the 160s systolic at home despite taking her 

meds. Denies HA, dizziness, visual changes, CP or SOB





See exam





Elevated BP


Compliant w/ meds


Asx from BP standpoint


-dc w/ pmd f/u for evaluation/management of her BP











*DC/Admit/Observation/Transfer


Diagnosis at time of Disposition: 


 Elevated blood pressure reading








- Discharge Dispostion


Disposition: HOME


Condition at time of disposition: Good





- Referrals





- Patient Instructions


Printed Discharge Instructions:  How to Monitor Your Blood Pressure at Home


Additional Instructions: 


Your blood pressure here is 161/94 here


Please follow up with your PMD for further management








- Post Discharge Activity

## 2019-10-30 ENCOUNTER — HOSPITAL ENCOUNTER (EMERGENCY)
Dept: HOSPITAL 74 - JER | Age: 59
Discharge: HOME | End: 2019-10-30
Payer: COMMERCIAL

## 2019-10-30 VITALS — TEMPERATURE: 98 F

## 2019-10-30 VITALS — DIASTOLIC BLOOD PRESSURE: 81 MMHG | SYSTOLIC BLOOD PRESSURE: 128 MMHG | HEART RATE: 73 BPM

## 2019-10-30 VITALS — BODY MASS INDEX: 24.2 KG/M2

## 2019-10-30 DIAGNOSIS — R10.13: Primary | ICD-10-CM

## 2019-10-30 DIAGNOSIS — E11.9: ICD-10-CM

## 2019-10-30 DIAGNOSIS — I10: ICD-10-CM

## 2019-10-30 DIAGNOSIS — K21.9: ICD-10-CM

## 2019-10-30 LAB
ALBUMIN SERPL-MCNC: 3.8 G/DL (ref 3.4–5)
ALP SERPL-CCNC: 67 U/L (ref 45–117)
ALT SERPL-CCNC: 32 U/L (ref 13–61)
ANION GAP SERPL CALC-SCNC: 8 MMOL/L (ref 8–16)
AST SERPL-CCNC: 29 U/L (ref 15–37)
BASOPHILS # BLD: 0.2 % (ref 0–2)
BILIRUB SERPL-MCNC: 0.7 MG/DL (ref 0.2–1)
BUN SERPL-MCNC: 19.1 MG/DL (ref 7–18)
CALCIUM SERPL-MCNC: 9 MG/DL (ref 8.5–10.1)
CHLORIDE SERPL-SCNC: 100 MMOL/L (ref 98–107)
CO2 SERPL-SCNC: 29 MMOL/L (ref 21–32)
CREAT SERPL-MCNC: 1.1 MG/DL (ref 0.55–1.3)
DEPRECATED RDW RBC AUTO: 12.8 % (ref 11.6–15.6)
EOSINOPHIL # BLD: 2.9 % (ref 0–4.5)
GLUCOSE SERPL-MCNC: 174 MG/DL (ref 74–106)
HCT VFR BLD CALC: 37.4 % (ref 32.4–45.2)
HGB BLD-MCNC: 12.5 GM/DL (ref 10.7–15.3)
INR BLD: 0.97 (ref 0.83–1.09)
LIPASE SERPL-CCNC: 195 U/L (ref 73–393)
LYMPHOCYTES # BLD: 33.2 % (ref 8–40)
MCH RBC QN AUTO: 31 PG (ref 25.7–33.7)
MCHC RBC AUTO-ENTMCNC: 33.4 G/DL (ref 32–36)
MCV RBC: 92.9 FL (ref 80–96)
MONOCYTES # BLD AUTO: 8.6 % (ref 3.8–10.2)
NEUTROPHILS # BLD: 55.1 % (ref 42.8–82.8)
PLATELET # BLD AUTO: 242 K/MM3 (ref 134–434)
PMV BLD: 10.2 FL (ref 7.5–11.1)
POTASSIUM SERPLBLD-SCNC: 3.7 MMOL/L (ref 3.5–5.1)
PROT SERPL-MCNC: 7.4 G/DL (ref 6.4–8.2)
PT PNL PPP: 11.4 SEC (ref 9.7–13)
RBC # BLD AUTO: 4.03 M/MM3 (ref 3.6–5.2)
SODIUM SERPL-SCNC: 137 MMOL/L (ref 136–145)
WBC # BLD AUTO: 7.8 K/MM3 (ref 4–10)

## 2019-10-30 PROCEDURE — 3E0333Z INTRODUCTION OF ANTI-INFLAMMATORY INTO PERIPHERAL VEIN, PERCUTANEOUS APPROACH: ICD-10-PCS | Performed by: EMERGENCY MEDICINE

## 2019-10-30 PROCEDURE — 3E033GC INTRODUCTION OF OTHER THERAPEUTIC SUBSTANCE INTO PERIPHERAL VEIN, PERCUTANEOUS APPROACH: ICD-10-PCS | Performed by: EMERGENCY MEDICINE

## 2019-10-30 NOTE — PDOC
Attending Attestation





- Resident


Resident Name: Luke Napier





- ED Attending Attestation


I have performed the following: I have examined & evaluated the patient, The 

case was reviewed & discussed with the resident, I agree w/resident's findings 

& plan, Exceptions are as noted





- HPI


HPI: 





10/30/19 18:45


59 F HTN, GERD, DM, cholecystectomy, presenting to ED with 3 days of epigastric 

pain. Pt states that she ate a heavy meal on Monday and subsequently developed 

pain in her epigastrum. Denies N/V. States that she took motrin with no relief. 

Pt denies CP/SOB. Denies diarrhea/constipation. States that the pain was 

particularly bad today after eating. 





- Physicial Exam


PE: 





10/30/19 18:47


"GENERAL: Awake, alert, and fully oriented, in no acute distress.


HEAD: No signs of trauma


EYES: PERRLA, EOMI, sclera anicteric, conjunctiva clear


ENT: Auricles normal inspection, hearing grossly normal, nares patent, 

oropharynx clear without exudates. Moist mucosa


NECK: Nontender, no stepoffs, Normal ROM, supple, no lymphadenopathy, JVD, or 

masses


LUNGS: Breath sounds equal, clear to auscultation bilaterally.  No wheezes, and 

no crackles


HEART: Regular rate and rhythm, normal S1 and S2, no murmurs, rubs or gallops


ABDOMEN: + epigastric TTP, normoactive bowel sounds.  No guarding, no rebound.  

No masses


EXTREMITIES: Normal range of motion, no edema.  No clubbing or cyanosis. No 

cords, erythema, or tenderness


NEUROLOGICAL: Cranial nerves II through XII intact. 5/5 strength and sensation 

in all extremities, Normal speech, normal gait, normal cerebellar function


SKIN: Warm, Dry, normal turgor, no rashes or lesions noted.





- Medical Decision Making





10/30/19 18:47


59 F with intermittent epigastric pain x 3 days. EKG unchanged since prior (TWI 

in V4-V5 unchanged). Exam notable only for epigastric TTP. Pt has had prior 

cholecystectomy.


- Labs, lipase, trop


- CXR


- GI cocktail





10/30/19 18:50


Labs wnl


Trop and lipase negative





Pt reassessed - now feels much better


Pt is well appearing, with normal vitals. Clinically stable for DC at this time.


I discussed the physical exam findings, ancillary test results and final 

diagnoses with the patient. I answered all of the patient's questions. The 

patient was satisfied with the care received and felt comfortable with the 

discharge plan and treatment plan.  The patient agrees to follow up with the 

primary care physician within 24-72 hours.

## 2019-10-30 NOTE — PDOC
History of Present Illness





- General


Chief Complaint: Pain


Stated Complaint: STOMACH PAIN


Time Seen by Provider: 10/30/19 15:47


History Source: Patient


Exam Limitations: No Limitations





- History of Present Illness


Initial Comments: 





10/30/19 16:24


Source: Patient


PMD: Ladonna Pandey


Cardiologist: Pablito Cody





HPI: 58yo F with PMH HTN, GERD, DM, s/p CCY in Oct 2018, > 1 year intermittent 

chest palpitations and substernal pain occurring when waking from sleep and at 

other times w/o predictable inciting circumstances (cardiology aware), 

presenting with epigastric pain for two days. Pain started gradually in the LUQ/

Epigastrium, radiated upwards, and was burning in nature. She reports this is 

consistent with prior pains for which she has been evaluated. She tried taking 

ibuprofen at home without relief. Unable to remember if there is an association 

with meals. Of note, patient returned on a flight from [place] on 10/14, denies 

unilateral leg swelling or pain, smoking, estrogen medications, history of 

cancer, prior clots, palpitations, or shortness of breath. No fevers, chills, 

productive cough, n/v/c/d. 





E provided patient with GI cocktail which she reports has improved her 

symptoms. 





All: NKDA


Meds: per chart


PMH: as above


PSH: as above








Past History





- Travel


Traveled outside of the country in the last 30 days: Yes


If so, where?: [place]


Close contact w/someone who was outside of country & ill: No





- Past Medical History


Allergies/Adverse Reactions: 


 Allergies











Allergy/AdvReac Type Severity Reaction Status Date / Time


 


No Known Allergies Allergy   Verified 10/30/19 15:51











Home Medications: 


Ambulatory Orders





Amlodipine Besylate 40 mg PO DAILY 09/06/18 


Hydrochlorothiazide [Hctz -] 25 mg PO DAILY 09/06/18 


Lisinopril [Prinivil] 40 mg PO DAILY 09/06/18 


Aspirin [ASA -] 81 mg PO DAILY 02/07/19 








COPD: No


Diabetes: Yes


HTN: Yes





- Immunization History


Immunization Up to Date: No





- Psycho Social/Smoking Cessation Hx


Smoking History: Never smoked


Have you smoked in the past 12 months: No


Information on smoking cessation initiated: No


Hx Alcohol Use: No


Drug/Substance Use Hx: No


Substance Use Type: None


Hx Substance Use Treatment: No





**Review of Systems





- Review of Systems


Able to Perform ROS?: Yes


Is the patient limited English proficient: Yes


Constitutional: No: Chills, Diaphoresis, Fever


HEENTM: No: Recent change in vision, Nose Congestion, Throat Pain


Respiratory: Yes: Cough (for 1 week, resovled).  No: Shortness of Breath, SOB 

with Exertion, SOB at Rest, Wheezing, Productive cough, Hemoptysis


Cardiac (ROS): Yes: Chest Pain (radiating upward from epigastrium ), 

Palpitations (occasionally, non recently).  No: Irregular Heart Rate, 

Lightheadedness, Syncope, Chest Tightness


ABD/GI: No: Constipated, Diarrhea, Nausea, Rectal Bleeding, Vomiting


: No: Burning, Dysuria, Frequency, Pain


Musculoskeletal: No: Muscle Pain, Muscle Weakness


Integumentary: No: Change in Color, Pruritus, Rash


Neurological: No: Headache, Numbness, Tingling, Weakness


Psychiatric: No: Stressors, Mood Swings, Change in Appetite


Endocrine: No: Symptoms Reported, Increased Thirst, Increased Urine, Change in 

Weight


Hematologic/Lymphatic: No: Symptoms Reported, Anemia, Blood Clots, Easy Bleeding


All Other Systems: Reviewed and Negative





*Physical Exam





- Vital Signs


 Last Vital Signs











Temp Pulse Resp BP Pulse Ox


 


 98.1 F   87   17   157/57 L  99 


 


 10/30/19 15:48  10/30/19 15:48  10/30/19 15:48  10/30/19 15:48  10/30/19 15:48














- Physical Exam


Comments: 





10/30/19 17:17


Vitals reviewed, AFVSS with hypertension to 146/84


WDWN woman, appears stated age, no acute distress, laying in hospital bed 

comfortably


EOMI, MMM, NCAT, PERRL, normal morphologies


RRR, nls1s2, no murmurs appreciated


CTABL, normal WOB, no wheezes / rales / rhonchi, no cough during 30 minutes of 

encounter


Soft, nontender, nondistended


2+ radial and PT pulses


WWP, no clubbing / cyanosis / edema


Alert and oriented, CN grossly intact, MAEE











**Heart Score/ECG Review





- History


History: Slightly suspicious





- Electrocardiogram


EKG: Normal





- Age


Age: 45-65





- Risk Factors


Risk Factors Heart Score: Yes Hx Hypertension, Yes Hx Diabetes


Based on the list above the patient has:: 1-2 risk factors





- Troponin


Troponin: </= normal limit





- Score


Heart Score - Total: 2





ED Treatment Course





- LABORATORY


CBC & Chemistry Diagram: 


 10/30/19 16:20





 10/30/19 16:20





Medical Decision Making





- Medical Decision Making





10/30/19 17:21


58yo F with PMH HTN, GERD, DM, s/p CCY in Oct 2018, > 1 year intermittent chest 

palpitations and substernal pain occurring when waking from sleep and at other 

times w/o predictable inciting circumstances (cardiology aware), presenting 

with epigastric pain for two days. History notable for midline, upward radiating

, burning pain relieved by GI cocktail from [facility]. Physical exam unremarkable 

aside from mild hypertension - notably no tachypnea or tachycardia. Cannot PERC 

patient, Well's Score 0. Patient has a history of chest pains without 

ultimately explanation, will send D-Dimer given recent long flight. DDX: most 

likely GERD, r/o ACS, less likely PE, MSK pain, costochondritis, PTX, 

pancreatitis.





Patient will be instructed to avoid NSAIDs, try GI medications for pain given 

history of GERD. 





-CBC, CMP, Lipase, Cardiac Profile, Coags and D-Dimer


-EKG, CXR, Monitor


-Toradol and Protonix given by [facility] provider to good effect





10/30/19 17:43


-Coags pending, other labs unremarkable including negative troponin and lipase


-EKG: Normal rate, rhythm, axis, no ST changes or other concerning morphologies

, T wave flattening vs inversion in V4/V5 c/w prior EKG





Dispo: Likely home





10/30/19 18:44


-Coags and D-dimer WNL


-CXR without PTX, consolidation, effusion, infiltrate, with normal cardiac 

silhouette and mediastinum, no acute fractures


-Given Maalox 30cc PO





RX Nexium, GI f/u





Dispo: Home








Discharge





- Discharge Information


Problems reviewed: Yes


Clinical Impression/Diagnosis: 


 Epigastric abdominal pain





Condition: Improved


Disposition: HOME





- Admission


No





- Follow up/Referral


Referrals: 


[facility] [place] [Provider Group]


Raciel Bentley MD [Staff Physician] - 





- Patient Discharge Instructions


Patient Printed Discharge Instructions:  DI for Gastroesophageal Reflux Disease 

(GERD)


Additional Instructions: 


You were seen and evaluated in the [facility] ED for your abdominal/chest pain. 

Your symptoms were consistent with GERD and deadly causes of chest pain were 

ruled out. 





A prescription for Nexium has been sent to your pharmacy. Take this as directed 

for the next 30 days. 





Continue to take Tylenol (Acetaminophen) for pain as needed. Try using Maalox, 

Pepcid, or Zantac for burning pain that could be GERD (heartburn) to see if 

those help your symptoms resolve. All of these medications are available over 

the counter at your pharmacy and should be taken as needed according to the 

package instructions. Avoid NSAIDs (Ibuprofen) given your history of GERD as 

NSAIDs can be hard on your stomach lining - you can take them occasionally 

along with food. 





Please follow up with your Primary Care Doctor in the next week. If you do not 

have one, call to schedule an appointment at the provided clinic ([facility]) for 

ongoing care and further evaluation. Also please call the provided GI physician

, Dr. Bentley, to arrange follow up for your GERD symptoms. 





Return to the ED for any new or concerning symptoms including but not limited to

: worsening pain non-responsive to pain medication, nausea or vomiting 

preventing you from taking water or medications by mouth, anything else that 

you find concerning. 











- Post Discharge Activity

## 2019-10-30 NOTE — PDOC
Rapid Medical Evaluation


Time Seen by Provider: 10/30/19 15:47


Medical Evaluation: 


 Allergies











Allergy/AdvReac Type Severity Reaction Status Date / Time


 


No Known Allergies Allergy   Verified 04/24/19 09:54











10/30/19 15:48


Pt c/o: epigastric pain since yesterday radiating to shoulders


Pt on brief exam: vss, no reproducible CP, epigastric tenderness


Pt ordered for: labs, ekg, toradol, protonix, 


Pt to proceed to the ED





**Discharge Disposition





- Diagnosis


 Epigastric abdominal pain








- Discharge Dispostion


Disposition: HOME


Condition at time of disposition: Improved





- Prescriptions


Prescriptions: 


Esomeprazole Magnesium [Nexium 24Hr] 20 mg PO DAILY #30 capsule.dr





- Referrals


Referrals: 


[facility] [place] [Provider Group]


Raciel Bentley MD [Staff Physician] - 





- Patient Instructions


Printed Discharge Instructions:  DI for Gastroesophageal Reflux Disease (GERD)


Additional Instructions: 


You were seen and evaluated in the [facility] ED for your abdominal/chest pain. 

Your symptoms were consistent with GERD and deadly causes of chest pain were 

ruled out. 





A prescription for Nexium has been sent to your pharmacy. Take this as directed 

for the next 30 days. 





Continue to take Tylenol (Acetaminophen) for pain as needed. Try using Maalox, 

Pepcid, or Zantac for burning pain that could be GERD (heartburn) to see if 

those help your symptoms resolve. All of these medications are available over 

the counter at your pharmacy and should be taken as needed according to the 

package instructions. Avoid NSAIDs (Ibuprofen) given your history of GERD as 

NSAIDs can be hard on your stomach lining - you can take them occasionally 

along with food. 





Please follow up with your Primary Care Doctor in the next week. If you do not 

have one, call to schedule an appointment at the provided clinic ([place]) for 

ongoing care and further evaluation. Also please call the provided GI physician

, Dr. Bentley, to arrange follow up for your GERD symptoms. 





Return to the ED for any new or concerning symptoms including but not limited to

: worsening pain non-responsive to pain medication, nausea or vomiting 

preventing you from taking water or medications by mouth, anything else that 

you find concerning. 











- Post Discharge Activity

## 2019-10-31 NOTE — EKG
Test Reason : 

Blood Pressure : ***/*** mmHG

Vent. Rate : 074 BPM     Atrial Rate : 074 BPM

   P-R Int : 202 ms          QRS Dur : 070 ms

    QT Int : 376 ms       P-R-T Axes : 044 046 058 degrees

   QTc Int : 417 ms

 

NORMAL SINUS RHYTHM

NONSPECIFIC T WAVE ABNORMALITY

ABNORMAL ECG

WHEN COMPARED WITH ECG OF 07-FEB-2019 05:34,

ST ELEVATION NOW PRESENT IN INFERIOR LEADS

NONSPECIFIC T WAVE ABNORMALITY, IMPROVED IN INFERIOR LEADS

Confirmed by FELICE MARKS, JING ([id number]) on 10/31/2019 12:23:05 PM

 

Referred By:             Confirmed By:JING VIVEROS MD

## 2020-02-18 ENCOUNTER — HOSPITAL ENCOUNTER (EMERGENCY)
Dept: HOSPITAL 74 - JER | Age: 60
Discharge: HOME | End: 2020-02-18
Payer: COMMERCIAL

## 2020-02-18 VITALS — HEART RATE: 66 BPM | DIASTOLIC BLOOD PRESSURE: 77 MMHG | SYSTOLIC BLOOD PRESSURE: 118 MMHG

## 2020-02-18 VITALS — TEMPERATURE: 98.6 F

## 2020-02-18 VITALS — BODY MASS INDEX: 24.7 KG/M2

## 2020-02-18 DIAGNOSIS — I10: ICD-10-CM

## 2020-02-18 DIAGNOSIS — K21.9: ICD-10-CM

## 2020-02-18 DIAGNOSIS — E11.9: Primary | ICD-10-CM

## 2020-02-18 DIAGNOSIS — Z90.49: ICD-10-CM

## 2020-02-18 LAB
ALBUMIN SERPL-MCNC: 3.8 G/DL (ref 3.4–5)
ALP SERPL-CCNC: 74 U/L (ref 45–117)
ALT SERPL-CCNC: 22 U/L (ref 13–61)
ANION GAP SERPL CALC-SCNC: 7 MMOL/L (ref 8–16)
APPEARANCE UR: CLEAR
AST SERPL-CCNC: 13 U/L (ref 15–37)
BACTERIA # UR AUTO: 4.4 /HPF
BASOPHILS # BLD: 0.1 % (ref 0–2)
BILIRUB SERPL-MCNC: 0.8 MG/DL (ref 0.2–1)
BILIRUB UR STRIP.AUTO-MCNC: NEGATIVE MG/DL
BUN SERPL-MCNC: 17.1 MG/DL (ref 7–18)
CALCIUM SERPL-MCNC: 8.8 MG/DL (ref 8.5–10.1)
CASTS URNS QL MICRO: 0 /LPF (ref 0–8)
CHLORIDE SERPL-SCNC: 100 MMOL/L (ref 98–107)
CO2 SERPL-SCNC: 31 MMOL/L (ref 21–32)
COLOR UR: YELLOW
CREAT SERPL-MCNC: 1.1 MG/DL (ref 0.55–1.3)
DEPRECATED RDW RBC AUTO: 12.7 % (ref 11.6–15.6)
EOSINOPHIL # BLD: 1.2 % (ref 0–4.5)
EPITH CASTS URNS QL MICRO: 0.7 /HPF
GLUCOSE SERPL-MCNC: 194 MG/DL (ref 74–106)
HCT VFR BLD CALC: 37.2 % (ref 32.4–45.2)
HGB BLD-MCNC: 12.4 GM/DL (ref 10.7–15.3)
KETONES UR QL STRIP: NEGATIVE
LEUKOCYTE ESTERASE UR QL STRIP.AUTO: (no result)
LIPASE SERPL-CCNC: 209 U/L (ref 73–393)
LYMPHOCYTES # BLD: 44.3 % (ref 8–40)
MCH RBC QN AUTO: 31.1 PG (ref 25.7–33.7)
MCHC RBC AUTO-ENTMCNC: 33.4 G/DL (ref 32–36)
MCV RBC: 93.1 FL (ref 80–96)
MONOCYTES # BLD AUTO: 8.6 % (ref 3.8–10.2)
NEUTROPHILS # BLD: 45.8 % (ref 42.8–82.8)
NITRITE UR QL STRIP: NEGATIVE
PH UR: 8 [PH] (ref 5–8)
PLATELET # BLD AUTO: 237 K/MM3 (ref 134–434)
PMV BLD: 10.6 FL (ref 7.5–11.1)
POTASSIUM SERPLBLD-SCNC: 3.3 MMOL/L (ref 3.5–5.1)
PROT SERPL-MCNC: 7.8 G/DL (ref 6.4–8.2)
PROT UR QL STRIP: NEGATIVE
PROT UR QL STRIP: NEGATIVE
RBC # BLD AUTO: 0 /HPF (ref 0–4)
RBC # BLD AUTO: 4 M/MM3 (ref 3.6–5.2)
SODIUM SERPL-SCNC: 138 MMOL/L (ref 136–145)
SP GR UR: 1 (ref 1.01–1.03)
UROBILINOGEN UR STRIP-MCNC: 0.2 MG/DL (ref 0.2–1)
WBC # BLD AUTO: 6 K/MM3 (ref 4–10)
WBC # UR AUTO: 0 /HPF (ref 0–5)

## 2020-02-18 PROCEDURE — 3E033GC INTRODUCTION OF OTHER THERAPEUTIC SUBSTANCE INTO PERIPHERAL VEIN, PERCUTANEOUS APPROACH: ICD-10-PCS

## 2020-02-18 PROCEDURE — 3E033NZ INTRODUCTION OF ANALGESICS, HYPNOTICS, SEDATIVES INTO PERIPHERAL VEIN, PERCUTANEOUS APPROACH: ICD-10-PCS

## 2020-02-18 NOTE — PDOC
*Physical Exam





- Vital Signs


 Last Vital Signs











Temp Pulse Resp BP Pulse Ox


 


 98.6 F   88   16   121/75   89 L


 


 02/18/20 16:21  02/18/20 16:21  02/18/20 16:21  02/18/20 16:21  02/18/20 16:21














ED Treatment Course





- LABORATORY


CBC & Chemistry Diagram: 


 02/18/20 16:50





 02/18/20 16:50





- ADDITIONAL ORDERS


Additional order review: 


 Laboratory  Results











  02/18/20 02/18/20





  16:50 16:50


 


Sodium   138


 


Potassium   3.3 L


 


Chloride   100


 


Carbon Dioxide   31


 


Anion Gap   7 L


 


BUN   17.1


 


Creatinine   1.1


 


Est GFR (CKD-EPI)AfAm   64.54


 


Est GFR (CKD-EPI)NonAf   55.69


 


Random Glucose   194 H


 


Calcium   8.8


 


Total Bilirubin   0.8


 


AST   13 L


 


ALT   22


 


Alkaline Phosphatase   74


 


Creatine Kinase   103


 


Troponin I   < 0.02


 


Total Protein   7.8


 


Albumin   3.8


 


Lipase   209


 


Urine Color  Yellow 


 


Urine Appearance  Clear 


 


Urine pH  8.0 


 


Ur Specific Gravity  1.004 L 


 


Urine Protein  Negative 


 


Urine Glucose (UA)  Negative 


 


Urine Ketones  Negative 


 


Urine Blood  Negative 


 


Urine Nitrite  Negative 


 


Urine Bilirubin  Negative 


 


Urine Urobilinogen  0.2 


 


Ur Leukocyte Esterase  Trace 


 


Urine WBC (Auto)  0 


 


Urine RBC (Auto)  0 


 


Urine Casts (Auto)  0 


 


U Epithel Cells (Auto)  0.7 


 


Urine Bacteria (Auto)  4.4 








 











  02/18/20





  16:50


 


RBC  4.00


 


MCV  93.1


 


MCHC  33.4


 


RDW  12.7


 


MPV  10.6


 


Neutrophils %  45.8


 


Lymphocytes %  44.3 H D


 


Monocytes %  8.6


 


Eosinophils %  1.2


 


Basophils %  0.1














- Medications


Given in the ED: 


ED Medications














Discontinued Medications














Generic Name Dose Route Start Last Admin





  Trade Name Freq  PRN Reason Stop Dose Admin


 


Acetaminophen  1,000 mg  02/18/20 16:27  02/18/20 18:20





  Ofirmev Injection -  IVPB  02/18/20 16:28  1,000 mg





  ONCE ONE   Administration





     





     





     





     


 


Al Hydroxide/Mg Hydroxide  30 ml  02/18/20 16:26  02/18/20 18:25





  Mylanta Suspension -  PO  02/18/20 16:27  30 ml





  ONCE ONE   Administration





     





     





     





     


 


Famotidine/Sodium Chloride  20 mg in 50 mls @ 100 mls/hr  02/18/20 16:26  02/18/ 20 18:30





  Pepcid 20 Mg Premixed Ivpb -  IVPB  02/18/20 16:55  100 mls/hr





  ONCE ONE   Administration





     





     





     





     


 


Sodium Chloride  1,000 mls @ 1,000 mls/hr  02/18/20 16:26  02/18/20 18:15





  Normal Saline -  IV  02/18/20 17:25  1,000 mls/hr





  ASDIR STA   Administration





     





     





     





     














Medical Decision Making





- Medical Decision Making





02/18/20 20:55


Patient feels much better. Negative cxr. Ok to go home with pepcid 

prescription. 





Discharge





- Discharge Information


Problems reviewed: Yes


Clinical Impression/Diagnosis: 


 GERD (gastroesophageal reflux disease)





Abdominal pain


Qualifiers:


 Abdominal location: epigastric Qualified Code(s): R10.13 - Epigastric pain





Condition: Improved


Disposition: HOME





- Admission


No





- Additional Discharge Information


Prescriptions: 


Famotidine [Pepcid -] 20 mg PO DAILY #7 tablet





- Follow up/Referral


Referrals: 


Ladonna Pandey MD [Primary Care Provider] - 





- Patient Discharge Instructions


Patient Printed Discharge Instructions:  DI for Dyspepsia


Additional Instructions: 


Come back to the emergency department for any new, worsening or concerning 

symptoms. 


Follow up with your primary care physician within 3-4 days. 





- Post Discharge Activity

## 2020-02-18 NOTE — PDOC
Rapid Medical Evaluation


Medical Evaluation: 


 Allergies











Allergy/AdvReac Type Severity Reaction Status Date / Time


 


No Known Allergies Allergy   Verified 02/18/20 16:21











02/18/20 16:21


Pt presents for abdominal pain since yesterday. She states it is in the middle 

of her stomach. She tried taking her protonix at home with no relief of symptoms


Exam: Epigastric tenderness


Orders: labs, lipase, pepcid/GI cocktail


Pt to proceed to the ER for further evaluation





**Discharge Disposition





- Diagnosis


Abdominal pain


Qualifiers:


 Abdominal location: epigastric Qualified Code(s): R10.13 - Epigastric pain








- Referrals





- Patient Instructions





- Post Discharge Activity

## 2020-02-18 NOTE — PDOC
History of Present Illness





- General


Chief Complaint: Pain


Stated Complaint: ABD PAIN


Time Seen by Provider: 02/18/20 16:28


History Source: Patient


Exam Limitations: No Limitations





- History of Present Illness


Initial Comments: 





02/18/20 18:10


59F with a PMH of HTN, GERD, DM, s/p cholecystectomy 10/2018 who presents to 

the ER for evaluation of epigastric pain. The patient states that she woke up 

yesterday morning and felt an epigastric discomfort. She describes this 

discomfort as a pressure, radiating to her back, without exacerbating or 

alleviating factors, not associated with fever, chills, CP, SOB, nausea, 

vomiting, diarrhea, constipation. She admits to "some" dysuria without flank 

pain or discharge. She denies tobacco, alcohol, or illicit drug use.





Past History





- Past Medical History


Allergies/Adverse Reactions: 


 Allergies











Allergy/AdvReac Type Severity Reaction Status Date / Time


 


No Known Allergies Allergy   Verified 02/18/20 16:21











Home Medications: 


Ambulatory Orders





Amlodipine Besylate 40 mg PO DAILY 09/06/18 


Hydrochlorothiazide [Hctz -] 25 mg PO DAILY 09/06/18 


Lisinopril [Prinivil] 40 mg PO DAILY 09/06/18 


Aspirin [ASA -] 81 mg PO DAILY 02/07/19 


Esomeprazole Magnesium [Nexium 24Hr] 20 mg PO DAILY #30 capsule. 10/30/19 


Famotidine [Pepcid -] 20 mg PO DAILY #7 tablet 02/18/20 








COPD: No


Diabetes: Yes


HTN: Yes





- Immunization History


Immunization Up to Date: No





- Psycho Social/Smoking Cessation Hx


Smoking History: Never smoked


Have you smoked in the past 12 months: No


Hx Alcohol Use: No


Drug/Substance Use Hx: No


Substance Use Type: None


Hx Substance Use Treatment: No





**Review of Systems





- Review of Systems


Able to Perform ROS?: Yes


Comments:: 





02/18/20 18:37


GENERAL/CONSTITUTIONAL: No fever or chills. No weakness.


HEAD, EYES, EARS, NOSE AND THROAT: No change in vision. No ear pain or 

discharge. No sore throat.


CARDIOVASCULAR: No chest pain, palpitations, or lightheadedness.


RESPIRATORY: No cough, wheezing, shortness of breath, or hemoptysis.


GASTROINTESTINAL: + for abdominal pain. No nausea, vomiting, diarrhea, or 

constipation. 


GENITOURINARY: + for dysuria. No frequency, hematuria, or change in urination.


MUSCULOSKELETAL: No joint or muscle swelling or pain. No neck or back pain.


SKIN: No rash or lesions. 


NEUROLOGIC: No headache, numbness, tingling, focal weakness, loss of 

consciousness, or change in strength/sensation.





Is the patient limited English proficient: No





*Physical Exam





- Vital Signs


 Last Vital Signs











Temp Pulse Resp BP Pulse Ox


 


 98.6 F   88   16   121/75   89 L


 


 02/18/20 16:21  02/18/20 16:21  02/18/20 16:21  02/18/20 16:21  02/18/20 16:21














- Physical Exam





02/18/20 18:38


GENERAL: Well developed, well nourished. Awake and alert. No acute distress.


HEENT: Normocephalic, atraumatic. Hearing grossly normal. Moist mucous 

membranes. PERRLA, EOMI. No conjunctival pallor. Sclera are non-icteric. 


NECK: Supple. Full ROM. No JVD. 


CARDIOVASCULAR: Regular rate and rhythm. No murmurs, rubs, or gallops. 


PULMONARY: No evidence of respiratory distress. Lungs clear to auscultation 

bilaterally. No wheezing, rales, or rhonchi.


ABDOMINAL: Soft. TTP in epigastrium. No rebound or guarding. 


GENITOURINARY: No CVA tenderness bilaterally.


MUSCULOSKELETAL: Normal range of motion at all joints. No bony deformities or 

tenderness. 


EXTREMITIES: No cyanosis. No clubbing. No edema. No calf tenderness or swelling.


SKIN: Warm and dry. Normal capillary refill. No rashes. No jaundice. 


NEUROLOGICAL: Alert, awake, appropriate. Cranial nerves 2-12 grossly intact. 

Normal speech. Gait is normal without ataxia.


PSYCHIATRIC: Cooperative. Good eye contact. Appropriate mood and affect.








ED Treatment Course





- LABORATORY


CBC & Chemistry Diagram: 


 02/18/20 16:50





 02/18/20 16:50





- ADDITIONAL ORDERS


Additional order review: 


 











  02/18/20





  16:50


 


RBC  4.00


 


MCV  93.1


 


MCHC  33.4


 


RDW  12.7


 


MPV  10.6


 


Neutrophils %  45.8


 


Lymphocytes %  44.3 H D


 


Monocytes %  8.6


 


Eosinophils %  1.2


 


Basophils %  0.1














Medical Decision Making





- Medical Decision Making





02/18/20 18:41


59F with MMP who presents with epigastric pain concerning for pancreatitis, 

gastritis, PUD, ACS, PNA. Obtaining labs and imaging. Giving symptomatic GI 

relief. 





02/18/20 19:10


Pt signed out to Dr. Han for further evaluation.





CBC, CMP WNL. Lipase and troponin negative. UA negative.








Discharge





- Discharge Information


Problems reviewed: Yes


Clinical Impression/Diagnosis: 


 GERD (gastroesophageal reflux disease)





Abdominal pain


Qualifiers:


 Abdominal location: epigastric Qualified Code(s): R10.13 - Epigastric pain





Condition: Improved


Disposition: HOME





- Additional Discharge Information


Prescriptions: 


Famotidine [Pepcid -] 20 mg PO DAILY #7 tablet





- Follow up/Referral


Referrals: 


Ladonna Pandey MD [Primary Care Provider] - 





- Patient Discharge Instructions


Patient Printed Discharge Instructions:  DI for Dyspepsia


Additional Instructions: 


Come back to the emergency department for any new, worsening or concerning 

symptoms. 


Follow up with your primary care physician within 3-4 days. 





- Post Discharge Activity

## 2020-02-19 NOTE — EKG
Test Reason : 

Blood Pressure : ***/*** mmHG

Vent. Rate : 062 BPM     Atrial Rate : 062 BPM

   P-R Int : 190 ms          QRS Dur : 070 ms

    QT Int : 442 ms       P-R-T Axes : 062 059 057 degrees

   QTc Int : 448 ms

 

*** POOR DATA QUALITY, INTERPRETATION MAY BE ADVERSELY AFFECTED

NORMAL SINUS RHYTHM

LEFT ATRIAL ENLARGEMENT

NONSPECIFIC T WAVE ABNORMALITY

ABNORMAL ECG

Confirmed by MD NOHEMI, ROSELIA ([id number]) on 2/19/2020 11:16:22 AM

 

Referred By:             Confirmed By:ROSELIA SONG MD

## 2020-02-19 NOTE — PDOC
Documentation entered by Ronnell Rivera SCRIBE, acting as scribe for Katie Gallardo MD.








Katie Gallardo MD:  This documentation has been prepared by the Miguel macario Nirvannie, SCRIBE, under my direction and personally reviewed by me in 

its entirety.  I confirm that the documentation accurately reflects all work, 

treatment, procedures, and medical decision making performed by me.  





Attending Attestation





- Resident


Resident Name: Jose Luis Hatch





- ED Attending Attestation


I have performed the following: I have examined & evaluated the patient, The 

case was reviewed & discussed with the resident, I agree w/resident's findings 

& plan, Exceptions are as noted





- HPI


HPI: 





02/18/20 18:47


The patient is a 59 year old female, with a significant past medical history of 

HTN, GERD, DM, who presents to the emergency department with 2 days of pressure-

like epigastric pain. She denies recent chest pain or shortness of breath.





Allergies: NKDA 


Past surgical history: Cholecystectomy (2018)


Primary Care Physician: Dr. Pandey








- Physicial Exam


PE: 





02/18/20 20:27


60 yo female p/w epigastric pain 


head ncat


neck supple


lungs ca b/l


cvs [id number]


abd epigastric tendernesses with palpation, no rebound, no guarding, soft abd


extremities no deformities,no venous stasis


no flank pain


neuro axox3, ambulatory ,motor strength 5/5 b/l





- Medical Decision Making





02/19/20 01:37


59-year-old female presenting with epigastric pain x1 day


No fever, no nausea vomiting


Labs reviewed and are unremarkable


Patient given PPIs prescription and discharged follow-up with Dr. Bentley(who 

she saw in Dec for similar complaint)

## 2021-04-25 PROBLEM — Z00.00 ENCOUNTER FOR PREVENTIVE HEALTH EXAMINATION: Status: ACTIVE | Noted: 2021-04-25

## 2021-04-26 ENCOUNTER — APPOINTMENT (OUTPATIENT)
Dept: HEART AND VASCULAR | Facility: CLINIC | Age: 61
End: 2021-04-26
Payer: COMMERCIAL

## 2021-04-26 VITALS — HEART RATE: 68 BPM | SYSTOLIC BLOOD PRESSURE: 120 MMHG | DIASTOLIC BLOOD PRESSURE: 82 MMHG

## 2021-04-26 PROCEDURE — 99204 OFFICE O/P NEW MOD 45 MIN: CPT

## 2021-04-26 PROCEDURE — 99072 ADDL SUPL MATRL&STAF TM PHE: CPT

## 2021-04-26 NOTE — PHYSICAL EXAM
[Well Developed] : well developed [Well Nourished] : well nourished [No Acute Distress] : no acute distress [Normal Conjunctiva] : normal conjunctiva [Normal Venous Pressure] : normal venous pressure [No Carotid Bruit] : no carotid bruit [Normal S1, S2] : normal S1, S2 [No Murmur] : no murmur [No Rub] : no rub [No Gallop] : no gallop [Clear Lung Fields] : clear lung fields [Good Air Entry] : good air entry [No Respiratory Distress] : no respiratory distress  [Soft] : abdomen soft [Non Tender] : non-tender [No Masses/organomegaly] : no masses/organomegaly [Normal Bowel Sounds] : normal bowel sounds [Normal Gait] : normal gait [Normal] : no edema, no cyanosis, no clubbing, no varicosities [No Edema] : no edema [No Cyanosis] : no cyanosis [No Clubbing] : no clubbing [No Varicosities] : no varicosities [No Rash] : no rash [No Skin Lesions] : no skin lesions [Moves all extremities] : moves all extremities [No Focal Deficits] : no focal deficits [Normal Speech] : normal speech [Alert and Oriented] : alert and oriented [Normal memory] : normal memory

## 2021-04-26 NOTE — HISTORY OF PRESENT ILLNESS
[FreeTextEntry1] : 59-year-old female with past medical history hypertension  here for first evaluation with this provider.\par The patient had normal coronaries on cardiac catheterization at [facility] in August of 206 16 as per her  previous cardiologist report.\par The patient reports episodes of chest pain, under the left breast a few times a week,  self resolving, mostly at rest. \par Reports Unlimited exercise tolerance\par Reports compliance with her medications \par Denies, shortness of breath, palpitations, syncope, presyncope, LE edema, orthopnea. \par \par PMH\par HTN\par DM (diet controlled)\par \par ALL\par NKDA\par \par MEDS\par Amlodipine 10 mg daily\par Hydrochlorothiazide 25 mg daily\par Lisinopril 40 mg daily\par aspirin 81 mg daily \par \par SH\par no smoke, no etoh, no drugs\par \par \par EKG 2/11/2021\par SR, non specific ST changes\par \par CAROTIDS 6/2020\par wnl\par \par ECHO 9/2019\par nomral LV function \par normal RV function \par \par \par

## 2021-04-26 NOTE — ASSESSMENT
[FreeTextEntry1] : 59-year-old female with past medical history hypertension  here for first evaluation with this provider.\par \par CHEST PAIN\par -in the setting of symptoms and abnormal EKG we'll order a nuclear stress  test to rule out any ischemia\par \par HTN\par -Well-controlled\par -Continue current medications\par -CMP today\par \par HLD\par -fasting lipid panel today, will assess need for a statin\par \par f/u in 4 weeks for nuc stress test and lab results \par

## 2021-05-24 ENCOUNTER — APPOINTMENT (OUTPATIENT)
Dept: HEART AND VASCULAR | Facility: CLINIC | Age: 61
End: 2021-05-24
Payer: COMMERCIAL

## 2021-05-24 VITALS — SYSTOLIC BLOOD PRESSURE: 134 MMHG | DIASTOLIC BLOOD PRESSURE: 82 MMHG | HEART RATE: 72 BPM

## 2021-05-24 PROCEDURE — 99215 OFFICE O/P EST HI 40 MIN: CPT

## 2021-06-03 NOTE — HISTORY OF PRESENT ILLNESS
[FreeTextEntry1] : PMD: Dr. Dudley\par \par 60-year-old female with past medical history hypertension  here for follow up and obtain ncu stress test results \par The patient had normal coronaries on cardiac catheterization at [facility] in August of 2016 as per her  previous cardiologist report but now c/o atyical chest pain despite antianginal to and good BP control. \par The patient reports episodes of chest pain, under the left breast a few times a week,  self resolving, mostly at rest. \par Reports Unlimited exercise tolerance.\par Reports compliance with her medications \par Denies, shortness of breath, palpitations, syncope, presyncope, LE edema, orthopnea. \par \par PMH\par HTN\par DM (diet controlled)\par \par ALL\par NKDA\par \par MEDS\par Amlodipine 10 mg daily\par Hydrochlorothiazide 25 mg daily\par Lisinopril 40 mg daily\par aspirin 81 mg daily \par \par SH\par no smoke, no etoh, no drugs\par \par \par EKG 2/11/2021\par SR, non specific ST changes\par \par CAROTIDS 6/2020\par wnl\par \par ECHO 9/2019\par nomral LV function \par \par NUCELAR Stress test 5/20/2021\par Nonischemic stress ECG\par Comparison of stress and rest perfusion images revealed no significant defects on rest or stress images\par -EF 60%\par \par normal RV function \par \par \par NUCLEAR STRESS TEST 5/21/2021\par \par \par \par

## 2021-06-03 NOTE — ASSESSMENT
[FreeTextEntry1] : 60-year-old female with past medical history hypertension and HTN  here for first evaluation with this provider.\par \par CHEST PAIN\par -atypical symptoms but in the setting of abnormal nuc,   abnormal EKG and persistence of symptoms with good BP control and on antianginal we will schedule for cardiac catheterization at the [facility] on June 4\par -Covid test in [place] on June 2\par -recc to continue with aspirin, will start atorvastatin \par -recc to call immdetiately if worsening chest pain\par \par HTN\par -Well-controlled\par -Continue current medications\par -CMP 5/202/2021 creat 1.0\par \par HLD\par -fasting lipid panel 5/20/2021: chol 148, hdl 58, trig 56, ldl 79\par -would start low dose statin as above in the setting of DM\par \par f/u in 2 weeks post cath\par \par !!! ADDENDUM 6/3/2021!!\par Official l nuclear stress test report from 5/20/2021 reviewed and now reported within normal limits\par In the setting of normal nuclear stress test, cardiac catheterization is not required.  Cardiac catheterization scheduled for June 4 has been canceled\par The patient was informed commended continue with medical therapy as above.\par \par

## 2021-06-21 ENCOUNTER — APPOINTMENT (OUTPATIENT)
Dept: HEART AND VASCULAR | Facility: CLINIC | Age: 61
End: 2021-06-21

## 2021-08-30 ENCOUNTER — APPOINTMENT (OUTPATIENT)
Dept: HEART AND VASCULAR | Facility: CLINIC | Age: 61
End: 2021-08-30
Payer: COMMERCIAL

## 2021-08-30 VITALS — SYSTOLIC BLOOD PRESSURE: 130 MMHG | HEART RATE: 94 BPM | DIASTOLIC BLOOD PRESSURE: 76 MMHG

## 2021-08-30 DIAGNOSIS — R07.9 CHEST PAIN, UNSPECIFIED: ICD-10-CM

## 2021-08-30 PROCEDURE — 99214 OFFICE O/P EST MOD 30 MIN: CPT

## 2021-08-30 NOTE — HISTORY OF PRESENT ILLNESS
[FreeTextEntry1] : PMD: Dr. Dudley\par \par 61-year-old female with past medical history hypertension  here for follow up\par The patient reports dyspnea on exertion after a few blocks.\par Reports atypical neck pain, non exertional.\par SHeis  compliant with her medications\par \par \par As per previous note:  \par The patient had normal coronaries on cardiac catheterization at [facility] in August of 2016 as per her  previous cardiologist report but now c/o atyical chest pain despite antianginal to and good BP control. \par The patient reports episodes of chest pain, under the left breast a few times a week,  self resolving, mostly at rest. \par Reports Unlimited exercise tolerance.\par Reports compliance with her medications \par Denies, shortness of breath, palpitations, syncope, presyncope, LE edema, orthopnea. \par \par PMH\par HTN\par DM (diet controlled)\par \par ALL\par NKDA\par \par MEDS\par Amlodipine 10 mg daily\par Hydrochlorothiazide 25 mg daily\par Lisinopril 40 mg daily\par aspirin 81 mg daily \par \par SH\par no smoke, no etoh, no drugs\par \par \par EKG 2/11/2021\par SR, non specific ST changes\par \par CAROTIDS 6/2020\par wnl\par \par ECHO 9/2019\par nomral LV function \par \par NUCELAR Stress test 5/20/2021\par Nonischemic stress ECG\par Comparison of stress and rest perfusion images revealed no significant defects on rest or stress images\par -EF 60%\par \par normal RV function \par \par \par NUCLEAR STRESS TEST 5/21/2021\par Nonischemic stress ECG\par No scan evidence of ischemia\par \par \par \par

## 2021-08-30 NOTE — ASSESSMENT
[FreeTextEntry1] : 61-year-old female with past medical history hypertension and HTN  here for follow-up\par \par \par CHEST PAIN\par -atypical symptoms but in the setting of persistence of symptoms with good BP control and on antianginal we will schedule for c coronary CTA to define coronary anatomy\par -recc to continue with aspirin 81 mg daily \par -cont  atorvastatin 20 mg dailly\par -recc to call immediately if worsening chest pain\par \par HTN\par -Well-controlled\par -Continue current medications\par -CMP 5/202/2021 creat 1.0\par -Echocardiogram \par \par HLD\par -fasting lipid panel 5/20/2021: chol 148, hdl 58, trig 56, ldl 79\par - start low dose statin as above in the setting of DM\par -Ultrasound carotids for eval of carotid bruit)\par \par \par f/u in 3 weeks for echo, carotids and CCTA results\par \par \par

## 2021-08-30 NOTE — PHYSICAL EXAM
[Well Developed] : well developed [Well Nourished] : well nourished [No Acute Distress] : no acute distress [Normal Conjunctiva] : normal conjunctiva [Normal Venous Pressure] : normal venous pressure [No Carotid Bruit] : no carotid bruit [Carotid Bruit] : carotid bruit [Normal S1, S2] : normal S1, S2 [No Murmur] : no murmur [No Rub] : no rub [No Gallop] : no gallop [Clear Lung Fields] : clear lung fields [Good Air Entry] : good air entry [No Respiratory Distress] : no respiratory distress  [Soft] : abdomen soft [Non Tender] : non-tender [No Masses/organomegaly] : no masses/organomegaly [Normal Bowel Sounds] : normal bowel sounds [Normal Gait] : normal gait [Normal] : no edema, no cyanosis, no clubbing, no varicosities [No Edema] : no edema [No Cyanosis] : no cyanosis [No Clubbing] : no clubbing [No Varicosities] : no varicosities [No Rash] : no rash [No Skin Lesions] : no skin lesions [Moves all extremities] : moves all extremities [No Focal Deficits] : no focal deficits [Normal Speech] : normal speech [Alert and Oriented] : alert and oriented [Normal memory] : normal memory [de-identified] : 1+ right

## 2021-08-31 PROBLEM — R07.9 CHEST PAIN: Status: ACTIVE | Noted: 2021-08-31

## 2021-09-08 DIAGNOSIS — R09.89 OTHER SPECIFIED SYMPTOMS AND SIGNS INVOLVING THE CIRCULATORY AND RESPIRATORY SYSTEMS: ICD-10-CM

## 2021-09-08 DIAGNOSIS — R06.00 DYSPNEA, UNSPECIFIED: ICD-10-CM

## 2021-10-04 ENCOUNTER — APPOINTMENT (OUTPATIENT)
Dept: HEART AND VASCULAR | Facility: CLINIC | Age: 61
End: 2021-10-04
Payer: COMMERCIAL

## 2021-10-04 VITALS — DIASTOLIC BLOOD PRESSURE: 80 MMHG | HEART RATE: 84 BPM | SYSTOLIC BLOOD PRESSURE: 116 MMHG

## 2021-10-04 PROCEDURE — 99214 OFFICE O/P EST MOD 30 MIN: CPT

## 2021-10-04 RX ORDER — ATORVASTATIN CALCIUM 20 MG/1
20 TABLET, FILM COATED ORAL
Qty: 30 | Refills: 3 | Status: ACTIVE | COMMUNITY
Start: 2021-10-04 | End: 1900-01-01

## 2021-10-04 NOTE — HISTORY OF PRESENT ILLNESS
[FreeTextEntry1] : PMD: Dr. Dudley\par \par 61-year-old female with past medical history hypertension  here for follow up and obtain CT results\par The patient denies any complaints\par Reports feeling fine with resolution of her chest pain\par Denies, shortness of breath, palpitations, syncope, presyncope, LE edema, orthopnea. \par \par \par \par As per previous note:  \par The patient had normal coronaries on cardiac catheterization at [facility] in August of 2016 as per her  previous cardiologist report but now c/o atyical chest pain despite antianginal to and good BP control. \par The patient reports episodes of chest pain, under the left breast a few times a week,  self resolving, mostly at rest. \par Reports Unlimited exercise tolerance.\par Reports compliance with her medications \par Denies, shortness of breath, palpitations, syncope, presyncope, LE edema, orthopnea. \par \par PMH\par HTN\par DM (diet controlled)\par \par ALL\par NKDA\par \par MEDS\par Amlodipine 10 mg daily\par Hydrochlorothiazide 25 mg daily\par Lisinopril 40 mg daily\par aspirin 81 mg daily \par \par SH\par no smoke, no etoh, no drugs\par \par \par EKG 2/11/2021\par SR, non specific ST changes\par \par EKG 10/4/2021\par Sinus rhythm, nonspecific ST changes\par \par CAROTIDS 6/2020\par wnl\par \par ECHO 9/2019\par nomral LV function \par \par NUCELAR Stress test 5/20/2021\par Nonischemic stress ECG\par Comparison of stress and rest perfusion images revealed no significant defects on rest or stress images\par -EF 60%\par \par normal RV function \par \par \par NUCLEAR STRESS TEST 5/21/2021\par Nonischemic stress ECG\par No scan evidence of ischemia\par \par \par CCTA 9/15/2021 ([facility])\par -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

## 2021-10-04 NOTE — ASSESSMENT
[FreeTextEntry1] : 61-year-old female with past medical history hypertension and HTN  here for follow-up\par \par CHEST PAIN\par -CCTA with minimal non obstructive CAD as above\par -In the setting of description of a pulmonary plaque recommended to be aggressive with medical therapy\par ---Recommend to continue with aspirin 81 g daily\par -Continue atorvastatin 20 mg daily, attain LDL less than 70\par As per CT report recommend evaluation with PMD and possibly GI for possible GERD diagnosis\par -Echocardiogram within normal limits\par -Recommended to call the clinic immediately if onset of such chest pain, will have low threshold for cardiac catheterization\par \par HTN\par -Well-controlled\par -Continue current medications\par -CMP 5/202/2021 creat 1.0\par -Echocardiogram within normal limits\par \par HLD\par -fasting lipid panel 5/20/2021: chol 148, hdl 58, trig 56, ldl 79\par -Continue atorvastatin 20 mg daily, particularly in the setting of DM\par -Ultrasound carotids wnl \par \par \par f/u in 3 months or sooner if any symptoms \par \par \par

## 2021-10-04 NOTE — PHYSICAL EXAM
[Well Developed] : well developed [Well Nourished] : well nourished [No Acute Distress] : no acute distress [Normal Conjunctiva] : normal conjunctiva [Normal Venous Pressure] : normal venous pressure [No Carotid Bruit] : no carotid bruit [Carotid Bruit] : carotid bruit [Normal S1, S2] : normal S1, S2 [No Murmur] : no murmur [No Rub] : no rub [No Gallop] : no gallop [Clear Lung Fields] : clear lung fields [Good Air Entry] : good air entry [No Respiratory Distress] : no respiratory distress  [Soft] : abdomen soft [Non Tender] : non-tender [No Masses/organomegaly] : no masses/organomegaly [Normal Bowel Sounds] : normal bowel sounds [Normal Gait] : normal gait [Normal] : no edema, no cyanosis, no clubbing, no varicosities [No Edema] : no edema [No Cyanosis] : no cyanosis [No Clubbing] : no clubbing [No Varicosities] : no varicosities [No Rash] : no rash [No Skin Lesions] : no skin lesions [Moves all extremities] : moves all extremities [No Focal Deficits] : no focal deficits [Normal Speech] : normal speech [Alert and Oriented] : alert and oriented [Normal memory] : normal memory [de-identified] : 1+ right

## 2021-10-04 NOTE — END OF VISIT
Erroneous encounter.    Nora Majano RN  [employer] [facility] RN  Lung Nodule and ED Lab Result RN  Ph# [phone]    
[Time Spent: ___ minutes] : I have spent [unfilled] minutes of time on the encounter.

## 2022-02-07 ENCOUNTER — APPOINTMENT (OUTPATIENT)
Dept: HEART AND VASCULAR | Facility: CLINIC | Age: 62
End: 2022-02-07
Payer: COMMERCIAL

## 2022-02-07 VITALS — HEART RATE: 75 BPM | DIASTOLIC BLOOD PRESSURE: 80 MMHG | SYSTOLIC BLOOD PRESSURE: 130 MMHG

## 2022-02-07 PROCEDURE — 99214 OFFICE O/P EST MOD 30 MIN: CPT

## 2022-02-07 NOTE — ASSESSMENT
[FreeTextEntry1] : 61-year-old female with past medical history hypertension and HTN  here for follow-up\par \par CHEST PAIN\par -CCTA with minimal non obstructive CAD as above\par -recommended to be aggressive with medical therapy\par ---Recommend to continue with aspirin 81 g daily\par -Continue atorvastatin 20 mg daily,  LDL less than 70 (please f/u lipid panel) \par -Echocardiogram within normal limits 8/2021\par -Recommended to call the clinic immediately if onset of such chest pain, will have low threshold for cardiac catheterization\par \par HTN\par -Well-controlled\par -Continue current medications\par -CMP 5/202/2021 creat 1.0\par -Echocardiogram within normal limits 8/2021\par -repeat cmp today\par \par HLD\par -fasting lipid panel 5/20/2021: chol 148, hdl 58, trig 56, ldl 79\par -Continue atorvastatin 20 mg daily, particularly in the setting of DM\par -Ultrasound carotids wnl \par \par \par f/u in 4 months or sooner if any symptoms \par obtain labs from PMD\par \par \par \par

## 2022-02-07 NOTE — HISTORY OF PRESENT ILLNESS
[FreeTextEntry1] : PMD: Dr. Dudley\par \par 61-year-old female with past medical history hypertension  here for follow up \par The patient denies any complaints\par Denies, shortness of breath, palpitations, syncope, presyncope, LE edema, orthopnea. \par She reports excellent exercise tolerance\par \par \par PMH\par HTN\par DM (diet controlled)\par \par ALL\par NKDA\par \par MEDS\par Amlodipine 10 mg daily\par Hydrochlorothiazide 25 mg daily\par Lisinopril 40 mg daily\par aspirin 81 mg daily \par atorvastatin 20 mg daily  ?\par \par SH\par no smoke, no etoh, no drugs\par \par \par EKG 2/11/2021\par SR, non specific ST changes\par \par EKG 10/4/2021\par Sinus rhythm, nonspecific ST changes\par \par CAROTIDS 6/2020\par wnl\par \par ECHO 9/2019\par nomral LV function \par \par NUCELAR Stress test 5/20/2021\par Nonischemic stress ECG\par Comparison of stress and rest perfusion images revealed no significant defects on rest or stress images\par -EF 60%\par normal RV function \par \par \par NUCLEAR STRESS TEST 5/21/2021\par Nonischemic stress ECG\par No scan evidence of ischemia\par \par \par CCTA 9/15/2021 ([facility])\par -Minimal nonobstructive CAD with vulnerable mixed plaque in the proximal LAD\par Mild thickening of the distal esophagus\par Consider gastroesophageal reflux\par As clinically warranted, direct visualization may be helpful\par \par \par Echocardiogram 8/30/2021\par Thickening of the aortic and mitral valve\par Mild TR\par Normal left ventricular function without segmental abnormalities or hypertrophy\par \par Carotids duplex 8/30/2021\par Normal carotid duplex with no evidence of atherosclerotic disease bilaterally\par \par \par EKG 10/4/2021: Sinus rhythm, nonspecific ST changes\par \par \par

## 2022-02-07 NOTE — PHYSICAL EXAM
[Well Developed] : well developed [Well Nourished] : well nourished [No Acute Distress] : no acute distress [Normal Conjunctiva] : normal conjunctiva [Normal Venous Pressure] : normal venous pressure [No Carotid Bruit] : no carotid bruit [Carotid Bruit] : carotid bruit [Normal S1, S2] : normal S1, S2 [No Murmur] : no murmur [No Rub] : no rub [No Gallop] : no gallop [Clear Lung Fields] : clear lung fields [Good Air Entry] : good air entry [No Respiratory Distress] : no respiratory distress  [Soft] : abdomen soft [Non Tender] : non-tender [No Masses/organomegaly] : no masses/organomegaly [Normal Bowel Sounds] : normal bowel sounds [Normal Gait] : normal gait [Normal] : no edema, no cyanosis, no clubbing, no varicosities [No Edema] : no edema [No Cyanosis] : no cyanosis [No Clubbing] : no clubbing [No Varicosities] : no varicosities [No Rash] : no rash [No Skin Lesions] : no skin lesions [Moves all extremities] : moves all extremities [No Focal Deficits] : no focal deficits [Normal Speech] : normal speech [Alert and Oriented] : alert and oriented [Normal memory] : normal memory [de-identified] : 1+ right

## 2022-10-12 ENCOUNTER — APPOINTMENT (OUTPATIENT)
Dept: HEART AND VASCULAR | Facility: CLINIC | Age: 62
End: 2022-10-12

## 2022-10-12 VITALS — DIASTOLIC BLOOD PRESSURE: 70 MMHG | SYSTOLIC BLOOD PRESSURE: 112 MMHG | HEART RATE: 87 BPM

## 2022-10-12 DIAGNOSIS — Z86.39 PERSONAL HISTORY OF OTHER ENDOCRINE, NUTRITIONAL AND METABOLIC DISEASE: ICD-10-CM

## 2022-10-12 DIAGNOSIS — Z86.79 PERSONAL HISTORY OF OTHER DISEASES OF THE CIRCULATORY SYSTEM: ICD-10-CM

## 2022-10-12 DIAGNOSIS — E78.5 HYPERLIPIDEMIA, UNSPECIFIED: ICD-10-CM

## 2022-10-12 PROCEDURE — 99212 OFFICE O/P EST SF 10 MIN: CPT | Mod: 25

## 2022-10-12 PROCEDURE — 93000 ELECTROCARDIOGRAM COMPLETE: CPT

## 2022-10-12 NOTE — HISTORY OF PRESENT ILLNESS
[FreeTextEntry1] : 61-year-old female with past medical history hypertension here for follow up. The patient denies any complaints\par Denies, shortness of breath, palpitations, syncope, presyncope, LE edema, orthopnea. She reports excellent exercise tolerance.\par \par PMH\par HTN\par DM (diet controlled)\par \par ALL\par NKDA\par \par MEDS\par Amlodipine 10 mg daily\par Hydrochlorothiazide 25 mg daily\par Lisinopril 40 mg daily\par aspirin 81 mg daily \par atorvastatin 20 mg daily\par \par SH\par no smoke, no etoh, no drugs\par \par \par EKG 2/11/2021\par SR, non specific ST changes\par \par EKG 10/4/2021\par Sinus rhythm, nonspecific ST changes\par \par CAROTIDS 6/2020\par wnl\par \par ECHO 9/2019\par nomral LV function \par \par NUCELAR Stress test 5/20/2021\par Nonischemic stress ECG\par Comparison of stress and rest perfusion images revealed no significant defects on rest or stress images\par -EF 60%\par normal RV function \par \par \par NUCLEAR STRESS TEST 5/21/2021\par Nonischemic stress ECG\par No scan evidence of ischemia\par \par \par CCTA 9/15/2021 ([facility])\par -Minimal nonobstructive CAD with vulnerable mixed plaque in the proximal LAD\par Mild thickening of the distal esophagus\par Consider gastroesophageal reflux\par As clinically warranted, direct visualization may be helpful\par \par \par Echocardiogram 8/30/2021\par Thickening of the aortic and mitral valve\par Mild TR\par Normal left ventricular function without segmental abnormalities or hypertrophy\par \par Carotids duplex 8/30/2021\par Normal carotid duplex with no evidence of atherosclerotic disease bilaterally\par \par \par EKG 10/4/2021: Sinus rhythm, nonspecific ST change

## 2022-10-12 NOTE — ASSESSMENT
[FreeTextEntry1] : 61-year-old female with past medical history hypertension here for follow up. The patient denies any complaints\par Denies, shortness of breath, palpitations, syncope, presyncope, LE edema, orthopnea. She reports excellent exercise tolerance. No current cardiopulmonary symptoms\par \par HTN: BP within acceptable range. Cont on current meds\par \par DM: Last glucose elevated slightly. Pt trying diet control. F/u closely with PMD. Will routine HbA1C evaluated and may require meds.\par \par HLD: Lipid panel pending. Cont with atorvastatin.\par \par F/U in 6 months

## 2023-01-18 ENCOUNTER — APPOINTMENT (OUTPATIENT)
Dept: HEART AND VASCULAR | Facility: CLINIC | Age: 63
End: 2023-01-18
Payer: COMMERCIAL

## 2023-01-18 PROCEDURE — 99214 OFFICE O/P EST MOD 30 MIN: CPT

## 2023-01-25 VITALS — HEART RATE: 81 BPM | SYSTOLIC BLOOD PRESSURE: 110 MMHG | DIASTOLIC BLOOD PRESSURE: 70 MMHG

## 2023-01-25 NOTE — HISTORY OF PRESENT ILLNESS
[FreeTextEntry1] : 62 year-old female with past medical history hypertension here for follow up to discuss possible vitamin supplementation. The patient denies any new complaints since last visit.\par Denies shortness of breath, palpitations, syncope, presyncope, LE edema, orthopnea. She reports excellent exercise tolerance.\par \par PMH\par HTN\par DM (diet controlled)\par \par ALL\par NKDA\par \par MEDS\par Amlodipine 10 mg daily\par Hydrochlorothiazide 25 mg daily\par Lisinopril 40 mg daily\par aspirin 81 mg daily \par atorvastatin 20 mg daily\par \par SH\par no smoke, no etoh, no drugs\par \par \par EKG 2/11/2021\par SR, non specific ST changes\par \par EKG 10/4/2021\par Sinus rhythm, nonspecific ST changes\par \par CAROTIDS 6/2020\par wnl\par \par ECHO 9/2019\par nomral LV function \par \par NUCELAR Stress test 5/20/2021\par Nonischemic stress ECG\par Comparison of stress and rest perfusion images revealed no significant defects on rest or stress images\par -EF 60%\par normal RV function \par \par \par NUCLEAR STRESS TEST 5/21/2021\par Nonischemic stress ECG\par No scan evidence of ischemia\par \par \par CCTA 9/15/2021 ([facility])\par -Minimal nonobstructive CAD with vulnerable mixed plaque in the proximal LAD\par Mild thickening of the distal esophagus\par Consider gastroesophageal reflux\par As clinically warranted, direct visualization may be helpful\par \par \par Echocardiogram 8/30/2021\par Thickening of the aortic and mitral valve\par Mild TR\par Normal left ventricular function without segmental abnormalities or hypertrophy\par \par Carotids duplex 8/30/2021\par Normal carotid duplex with no evidence of atherosclerotic disease bilaterally

## 2023-01-25 NOTE — DISCUSSION/SUMMARY
[FreeTextEntry1] : 62F here for f/u\par \par \par HTN: BP within acceptable range. Cont on current meds\par \par DM: Last glucose elevated slightly. Pt trying diet control. F/u closely with PMD. Will routine HbA1C evaluated and may require meds. Advised pt to be careful with OTC supplements besides a multivitamin daily.\par \par HLD: Lipid panel pending. Cont with atorvastatin.

## 2023-04-20 ENCOUNTER — APPOINTMENT (OUTPATIENT)
Dept: HEART AND VASCULAR | Facility: CLINIC | Age: 63
End: 2023-04-20
Payer: COMMERCIAL

## 2023-04-20 PROCEDURE — 99442: CPT

## 2023-04-27 NOTE — PHYSICAL EXAM
[de-identified] : Please reference PE from last clinic visit. Due to nature of visit unable to do physical exam

## 2023-04-27 NOTE — DISCUSSION/SUMMARY
[FreeTextEntry1] : 62F contacted via telehealth for f/u\par \par \par HTN: BP within acceptable range. Cont on current meds. She will come to clinic in 2 weeks to evaluate BP\par \par DM: Last glucose elevated slightly. Pt trying diet control. F/u closely with PMD. Will have routine HbA1C evaluated and may require meds. Advised pt to be careful with OTC supplements besides a multivitamin daily.\par \par HLD: Lipid panel pending. Cont with atorvastatin.\par \par Time for Telehealth: minutes

## 2023-04-27 NOTE — REASON FOR VISIT
[Home] : at home, [unfilled] , at the time of the visit. [Medical Office: ([facility])___] : at the medical office located in  [Patient] : the patient [Self] : self [This encounter was initiated by telehealth (audio with video) and converted to telephone (audio only) due to technical difficulties.] : This encounter was initiated by telehealth (audio with video) and converted to telephone (audio only) due to technical difficulties.

## 2023-04-27 NOTE — HISTORY OF PRESENT ILLNESS
[FreeTextEntry1] : 62 year-old female with past medical history hypertension wanted to have telehealth f/u regarding her BP. She reports that her SBP has been around 150 over the last week. Denies chest pain, sob, weakness, diaphoresis, palpitations, dizziness or loc. \par \par PMH\par HTN\par DM (diet controlled)\par \par ALL\par NKDA\par \par MEDS\par Amlodipine 10 mg daily\par Hydrochlorothiazide 25 mg daily\par Lisinopril 40 mg daily\par aspirin 81 mg daily \par atorvastatin 20 mg daily\par \par SH\par no smoke, no etoh, no drugs\par \par CAROTIDS 6/2020\par wnl\par \par ECHO 9/2019\par nomral LV function \par \par NUCELAR Stress test 5/20/2021\par Nonischemic stress ECG\par Comparison of stress and rest perfusion images revealed no significant defects on rest or stress images\par -EF 60%\par normal RV function \par \par \par NUCLEAR STRESS TEST 5/21/2021\par Nonischemic stress ECG\par No scan evidence of ischemia\par \par \par CCTA 9/15/2021 (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

## 2023-05-03 ENCOUNTER — APPOINTMENT (OUTPATIENT)
Dept: HEART AND VASCULAR | Facility: CLINIC | Age: 63
End: 2023-05-03
Payer: COMMERCIAL

## 2023-05-03 PROCEDURE — 93000 ELECTROCARDIOGRAM COMPLETE: CPT

## 2023-05-03 PROCEDURE — 99213 OFFICE O/P EST LOW 20 MIN: CPT | Mod: 25

## 2023-05-03 NOTE — DISCUSSION/SUMMARY
[FreeTextEntry1] : 62F here for f/u\par \par \par HTN: BP improved. Cont on current meds. \par \par DM: Last glucose elevated slightly. Pt trying diet control. F/u closely with PMD. Will routine HbA1C evaluated and may require meds. \par \par HLD: Lipid panel pending. Cont with atorvastatin.\par \par RTC in 4-6 months

## 2023-05-03 NOTE — HISTORY OF PRESENT ILLNESS
[FreeTextEntry1] : 62 year-old female with past medical history hypertension here for f/u. BP significantly improved. Denies chest pain, sob, weakness, diaphoresis, palpitations, dizziness or loc. \par \par PMH\par HTN\par DM (diet controlled)\par \par ALL\par NKDA\par \par MEDS\par Amlodipine 10 mg daily\par Hydrochlorothiazide 25 mg daily\par Lisinopril 40 mg daily\par aspirin 81 mg daily \par atorvastatin 20 mg daily\par \par SH\par no smoke, no etoh, no drugs\par \par CAROTIDS 6/2020\par wnl\par \par ECHO 9/2019\par nomral LV function \par \par NUCELAR Stress test 5/20/2021\par Nonischemic stress ECG\par Comparison of stress and rest perfusion images revealed no significant defects on rest or stress images\par -EF 60%\par normal RV function \par \par \par NUCLEAR STRESS TEST 5/21/2021\par Nonischemic stress ECG\par No scan evidence of ischemia\par \par \par CCTA 9/15/2021 ([facility])\par -Minimal nonobstructive CAD with vulnerable mixed plaque in the proximal LAD\par Mild thickening of the distal esophagus\par Consider gastroesophageal reflux\par As clinically warranted, direct visualization may be helpful\par \par \par Echocardiogram 8/30/2021\par Thickening of the aortic and mitral valve\par Mild TR\par Normal left ventricular function without segmental abnormalities or hypertrophy\par \par Carotids duplex 8/30/2021\par Normal carotid duplex with no evidence of atherosclerotic disease bilaterally

## 2023-05-03 NOTE — PHYSICAL EXAM
[Well Developed] : well developed [Well Nourished] : well nourished [No Acute Distress] : no acute distress [Normal Conjunctiva] : normal conjunctiva [Normal Venous Pressure] : normal venous pressure [No Carotid Bruit] : no carotid bruit [Normal S1, S2] : normal S1, S2 [No Murmur] : no murmur [Clear Lung Fields] : clear lung fields [Good Air Entry] : good air entry [No Respiratory Distress] : no respiratory distress  [Soft] : abdomen soft [Non Tender] : non-tender [Normal Bowel Sounds] : normal bowel sounds [Normal Gait] : normal gait [No Edema] : no edema [No Cyanosis] : no cyanosis [No Rash] : no rash [Moves all extremities] : moves all extremities [No Focal Deficits] : no focal deficits [Normal Speech] : normal speech [Alert and Oriented] : alert and oriented [Normal memory] : normal memory